# Patient Record
Sex: MALE | Race: WHITE | HISPANIC OR LATINO | Employment: OTHER | URBAN - METROPOLITAN AREA
[De-identification: names, ages, dates, MRNs, and addresses within clinical notes are randomized per-mention and may not be internally consistent; named-entity substitution may affect disease eponyms.]

---

## 2023-09-04 ENCOUNTER — HOSPITAL ENCOUNTER (EMERGENCY)
Facility: HOSPITAL | Age: 38
Discharge: HOME | End: 2023-09-04
Attending: FAMILY MEDICINE

## 2023-09-04 VITALS
DIASTOLIC BLOOD PRESSURE: 89 MMHG | HEART RATE: 95 BPM | SYSTOLIC BLOOD PRESSURE: 150 MMHG | TEMPERATURE: 98 F | RESPIRATION RATE: 20 BRPM | WEIGHT: 175 LBS | BODY MASS INDEX: 29.16 KG/M2 | OXYGEN SATURATION: 100 % | HEIGHT: 65 IN

## 2023-09-04 DIAGNOSIS — L08.9 RIGHT FOOT INFECTION: ICD-10-CM

## 2023-09-04 DIAGNOSIS — R42 DIZZINESS: ICD-10-CM

## 2023-09-04 DIAGNOSIS — E11.00 DIABETES MELLITUS TYPE 2 WITH HYPEROSMOLARITY, UNCONTROLLED: Primary | ICD-10-CM

## 2023-09-04 LAB
ALBUMIN SERPL BCP-MCNC: 4 G/DL (ref 3.5–5.2)
ALP SERPL-CCNC: 191 U/L (ref 38–126)
ALT SERPL W/O P-5'-P-CCNC: 21 U/L (ref 10–44)
ANION GAP SERPL CALC-SCNC: 13 MMOL/L (ref 8–16)
AST SERPL-CCNC: 18 U/L (ref 15–46)
B-OH-BUTYR BLD STRIP-SCNC: 0.1 MMOL/L (ref 0–0.5)
BACTERIA #/AREA URNS AUTO: NORMAL /HPF
BASOPHILS # BLD AUTO: 0.03 K/UL (ref 0–0.2)
BASOPHILS NFR BLD: 0.3 % (ref 0–1.9)
BILIRUB SERPL-MCNC: 0.7 MG/DL (ref 0.1–1)
BILIRUB UR QL STRIP: NEGATIVE
CALCIUM SERPL-MCNC: 9.3 MG/DL (ref 8.7–10.5)
CHLORIDE SERPL-SCNC: 99 MMOL/L (ref 95–110)
CLARITY UR REFRACT.AUTO: CLEAR
CO2 SERPL-SCNC: 21 MMOL/L (ref 23–29)
COLOR UR AUTO: YELLOW
CREAT SERPL-MCNC: 0.95 MG/DL (ref 0.5–1.4)
DIFFERENTIAL METHOD: ABNORMAL
EOSINOPHIL # BLD AUTO: 0.1 K/UL (ref 0–0.5)
EOSINOPHIL NFR BLD: 1.4 % (ref 0–8)
ERYTHROCYTE [DISTWIDTH] IN BLOOD BY AUTOMATED COUNT: 11.3 % (ref 11.5–14.5)
EST. GFR  (NO RACE VARIABLE): >60 ML/MIN/1.73 M^2
GLUCOSE SERPL-MCNC: 506 MG/DL (ref 70–110)
GLUCOSE UR QL STRIP: ABNORMAL
HCT VFR BLD AUTO: 36.2 % (ref 40–54)
HGB BLD-MCNC: 13.3 G/DL (ref 14–18)
HGB UR QL STRIP: ABNORMAL
HYALINE CASTS UR QL AUTO: 0 /LPF
IMM GRANULOCYTES # BLD AUTO: 0.03 K/UL (ref 0–0.04)
IMM GRANULOCYTES NFR BLD AUTO: 0.3 % (ref 0–0.5)
KETONES UR QL STRIP: NEGATIVE
LEUKOCYTE ESTERASE UR QL STRIP: NEGATIVE
LYMPHOCYTES # BLD AUTO: 1.9 K/UL (ref 1–4.8)
LYMPHOCYTES NFR BLD: 21.6 % (ref 18–48)
MCH RBC QN AUTO: 30.4 PG (ref 27–31)
MCHC RBC AUTO-ENTMCNC: 36.7 G/DL (ref 32–36)
MCV RBC AUTO: 83 FL (ref 82–98)
MICROSCOPIC COMMENT: NORMAL
MONOCYTES # BLD AUTO: 0.8 K/UL (ref 0.3–1)
MONOCYTES NFR BLD: 9 % (ref 4–15)
NEUTROPHILS # BLD AUTO: 5.9 K/UL (ref 1.8–7.7)
NEUTROPHILS NFR BLD: 67.4 % (ref 38–73)
NITRITE UR QL STRIP: NEGATIVE
NRBC BLD-RTO: 0 /100 WBC
PH UR STRIP: 6 [PH] (ref 5–8)
PLATELET # BLD AUTO: 275 K/UL (ref 150–450)
PMV BLD AUTO: 10.9 FL (ref 9.2–12.9)
POCT GLUCOSE: 270 MG/DL (ref 70–110)
POCT GLUCOSE: 442 MG/DL (ref 70–110)
POCT GLUCOSE: 448 MG/DL (ref 70–110)
POTASSIUM SERPL-SCNC: 4.5 MMOL/L (ref 3.5–5.1)
PROT SERPL-MCNC: 7.9 G/DL (ref 6–8.4)
PROT UR QL STRIP: ABNORMAL
RBC # BLD AUTO: 4.38 M/UL (ref 4.6–6.2)
RBC #/AREA URNS AUTO: 2 /HPF (ref 0–4)
SODIUM SERPL-SCNC: 133 MMOL/L (ref 136–145)
SP GR UR STRIP: 1.01 (ref 1–1.03)
URN SPEC COLLECT METH UR: ABNORMAL
UROBILINOGEN UR STRIP-ACNC: NEGATIVE EU/DL
UUN UR-MCNC: 20 MG/DL (ref 2–20)
WBC # BLD AUTO: 8.8 K/UL (ref 3.9–12.7)
WBC #/AREA URNS AUTO: 1 /HPF (ref 0–5)
YEAST UR QL AUTO: NORMAL

## 2023-09-04 PROCEDURE — 93005 ELECTROCARDIOGRAM TRACING: CPT | Mod: ER

## 2023-09-04 PROCEDURE — 82962 GLUCOSE BLOOD TEST: CPT | Mod: ER

## 2023-09-04 PROCEDURE — 25000003 PHARM REV CODE 250: Mod: ER | Performed by: FAMILY MEDICINE

## 2023-09-04 PROCEDURE — 96374 THER/PROPH/DIAG INJ IV PUSH: CPT | Mod: ER

## 2023-09-04 PROCEDURE — 63600175 PHARM REV CODE 636 W HCPCS: Mod: ER | Performed by: FAMILY MEDICINE

## 2023-09-04 PROCEDURE — 93010 ELECTROCARDIOGRAM REPORT: CPT | Mod: ,,, | Performed by: INTERNAL MEDICINE

## 2023-09-04 PROCEDURE — 82010 KETONE BODYS QUAN: CPT | Mod: ER | Performed by: FAMILY MEDICINE

## 2023-09-04 PROCEDURE — 93010 EKG 12-LEAD: ICD-10-PCS | Mod: ,,, | Performed by: INTERNAL MEDICINE

## 2023-09-04 PROCEDURE — 81000 URINALYSIS NONAUTO W/SCOPE: CPT | Mod: ER | Performed by: FAMILY MEDICINE

## 2023-09-04 PROCEDURE — 85025 COMPLETE CBC W/AUTO DIFF WBC: CPT | Mod: ER | Performed by: FAMILY MEDICINE

## 2023-09-04 PROCEDURE — 99900035 HC TECH TIME PER 15 MIN (STAT): Mod: ER

## 2023-09-04 PROCEDURE — 80053 COMPREHEN METABOLIC PANEL: CPT | Mod: ER | Performed by: FAMILY MEDICINE

## 2023-09-04 PROCEDURE — 99284 EMERGENCY DEPT VISIT MOD MDM: CPT | Mod: 25,ER

## 2023-09-04 PROCEDURE — 96375 TX/PRO/DX INJ NEW DRUG ADDON: CPT | Mod: ER

## 2023-09-04 PROCEDURE — 96361 HYDRATE IV INFUSION ADD-ON: CPT | Mod: ER

## 2023-09-04 RX ORDER — CEPHALEXIN 500 MG/1
500 CAPSULE ORAL EVERY 6 HOURS
Qty: 40 CAPSULE | Refills: 0 | Status: ON HOLD | OUTPATIENT
Start: 2023-09-04 | End: 2023-09-13

## 2023-09-04 RX ORDER — METFORMIN HYDROCHLORIDE 1000 MG/1
1000 TABLET ORAL 2 TIMES DAILY WITH MEALS
Qty: 180 TABLET | Refills: 3 | Status: ON HOLD | OUTPATIENT
Start: 2023-09-04 | End: 2023-09-13

## 2023-09-04 RX ORDER — GLYBURIDE 2.5 MG/1
2.5 TABLET ORAL
Qty: 90 TABLET | Refills: 3 | Status: ON HOLD | OUTPATIENT
Start: 2023-09-04 | End: 2023-09-13

## 2023-09-04 RX ADMIN — INSULIN HUMAN 10 UNITS: 100 INJECTION, SOLUTION PARENTERAL at 02:09

## 2023-09-04 RX ADMIN — PIPERACILLIN AND TAZOBACTAM 4.5 G: 4; .5 INJECTION, POWDER, LYOPHILIZED, FOR SOLUTION INTRAVENOUS; PARENTERAL at 02:09

## 2023-09-04 RX ADMIN — SODIUM CHLORIDE 1000 ML: 9 INJECTION, SOLUTION INTRAVENOUS at 02:09

## 2023-09-04 NOTE — ED PROVIDER NOTES
Encounter Date: 9/4/2023       History     Chief Complaint   Patient presents with    Dizziness     I am very dizzy. I think my diabetes maybe bothering me. I haven't had my diabetes pill in 8 months. My mother passed away 2 months ago and havent been taking medicines. I have a pcp in [place] and have been here 1 month and just moved here.  Pt also reports there is wound on my right foot 4 days ago      38-year-old male with history of diabetes has been noncompliant with his medications for few months.  He has been complaining of dizziness lately.  Also has injury to right dorsum of foot with erythema.  Right middle toe bruising and tenderness.    The history is provided by the patient.     Review of patient's allergies indicates:  No Known Allergies  Past Medical History:   Diagnosis Date    Diabetes mellitus      History reviewed. No pertinent surgical history.  No family history on file.  Social History     Tobacco Use    Smoking status: Never    Smokeless tobacco: Never   Substance Use Topics    Alcohol use: Not Currently    Drug use: Not Currently     Review of Systems   Constitutional:  Negative for fever.   HENT:  Negative for sore throat.    Respiratory:  Negative for shortness of breath.    Cardiovascular:  Negative for chest pain.   Gastrointestinal:  Negative for nausea.   Genitourinary:  Negative for dysuria.   Musculoskeletal:  Negative for back pain.   Skin:  Positive for wound. Negative for rash.   Neurological:  Positive for dizziness. Negative for weakness.   Hematological:  Does not bruise/bleed easily.   All other systems reviewed and are negative.      Physical Exam     Initial Vitals [09/04/23 1330]   BP Pulse Resp Temp SpO2   (!) 166/104 106 15 98.1 °F (36.7 °C) 98 %      MAP       --         Physical Exam    Nursing note and vitals reviewed.  Constitutional: Vital signs are normal. He appears well-developed and well-nourished. He is active. No distress.   HENT:   Head: Normocephalic.   Nose:  Nose normal.   Mouth/Throat: Oropharynx is clear and moist and mucous membranes are normal.   Eyes: Conjunctivae, EOM and lids are normal.   Neck: Neck supple.   Normal range of motion.  Cardiovascular:  Normal rate, regular rhythm, S1 normal, S2 normal and normal heart sounds.           Pulmonary/Chest: Breath sounds normal. No respiratory distress. He has no wheezes. He has no rhonchi. He has no rales.   Abdominal: Abdomen is soft. Bowel sounds are normal. He exhibits no distension. There is no abdominal tenderness. There is no rebound and no guarding.   Musculoskeletal:      Right upper arm: Normal.      Left upper arm: Normal.      Cervical back: Normal range of motion and neck supple.      Right lower leg: Normal.      Left lower leg: Normal.     Neurological: He is alert and oriented to person, place, and time. He has normal strength. GCS score is 15. GCS eye subscore is 4. GCS verbal subscore is 5. GCS motor subscore is 6.   Skin: Skin is warm. Capillary refill takes less than 2 seconds.   Psychiatric: He has a normal mood and affect. His speech is normal and behavior is normal. Thought content normal. Cognition and memory are normal.         ED Course   Procedures  Labs Reviewed   URINALYSIS, REFLEX TO URINE CULTURE - Abnormal; Notable for the following components:       Result Value    Protein, UA 2+ (*)     Glucose, UA 3+ (*)     Occult Blood UA 1+ (*)     All other components within normal limits    Narrative:     Preferred Collection Type->Urine, Clean Catch  Specimen Source->Urine   CBC W/ AUTO DIFFERENTIAL - Abnormal; Notable for the following components:    RBC 4.38 (*)     Hemoglobin 13.3 (*)     Hematocrit 36.2 (*)     MCHC 36.7 (*)     RDW 11.3 (*)     All other components within normal limits   COMPREHENSIVE METABOLIC PANEL - Abnormal; Notable for the following components:    Sodium 133 (*)     CO2 21 (*)     Glucose 506 (*)     Alkaline Phosphatase 191 (*)     All other components within normal  limits    Narrative:       Glucose critical result(s) called and verbal readback obtained from   Pily Madrid by JCT1 09/04/2023 14:53   POCT GLUCOSE - Abnormal; Notable for the following components:    POCT Glucose 442 (*)     All other components within normal limits   POCT GLUCOSE - Abnormal; Notable for the following components:    POCT Glucose 448 (*)     All other components within normal limits   POCT GLUCOSE - Abnormal; Notable for the following components:    POCT Glucose 270 (*)     All other components within normal limits   BETA - HYDROXYBUTYRATE, SERUM   URINALYSIS MICROSCOPIC    Narrative:     Preferred Collection Type->Urine, Clean Catch  Specimen Source->Urine   POCT GLUCOSE MONITORING CONTINUOUS          Imaging Results    None          Medications   sodium chloride 0.9% bolus 1,000 mL 1,000 mL (0 mLs Intravenous Stopped 9/4/23 1439)   insulin regular injection 10 Units 0.1 mL (10 Units Intravenous Given 9/4/23 1404)   piperacillin-tazobactam (ZOSYN) 4.5 g in dextrose 5 % in water (D5W) 100 mL IVPB (MB+) (4.5 g Intravenous New Bag 9/4/23 1440)     Medical Decision Making  Diabetes mellitus noncompliant to medications for last few months.  Used to take metformin.  Also has injury to right foot with erythema and bruising.  No fever chills or rigors.    Differential diagnosis include= DKA, hyperglycemia, HHNK, medication noncompliance, wound infection,    Accu-Chek 442.  Initiated insulin, IV fluids and Zosyn.  Recheck blood sugar improved to 270.  Refills given on his medication metformin and added glyburide.  Right foot infection treated with Zosyn in ED and prescription for Keflex.  He is advised to follow up PCP/ED immediately with any worsening symptoms.        Amount and/or Complexity of Data Reviewed  Labs: ordered.     Details: Beta hydroxy normal range.  Glucose 506.  White cell count 8.8.    Risk  OTC drugs.  Prescription drug management.                               Clinical Impression:    Final diagnoses:  [R42] Dizziness  [E11.00, E11.65] Diabetes mellitus type 2 with hyperosmolarity, uncontrolled (Primary)  [L08.9] Right foot infection        ED Disposition Condition    Discharge Stable          ED Prescriptions       Medication Sig Dispense Start Date End Date Auth. Provider    metFORMIN (GLUCOPHAGE) 1000 MG tablet Take 1 tablet (1,000 mg total) by mouth 2 (two) times daily with meals. 180 tablet 9/4/2023 9/3/2024 Dylan Soares MD    glyBURIDE (DIABETA) 2.5 MG tablet Take 1 tablet (2.5 mg total) by mouth daily with breakfast. 90 tablet 9/4/2023 9/3/2024 Dylan Soares MD    cephALEXin (KEFLEX) 500 MG capsule Take 1 capsule (500 mg total) by mouth every 6 (six) hours. 40 capsule 9/4/2023 -- Dylan Soares MD          Follow-up Information    None          Dylan Soares MD  09/04/23 6041

## 2023-09-12 ENCOUNTER — HOSPITAL ENCOUNTER (INPATIENT)
Facility: HOSPITAL | Age: 38
LOS: 5 days | Discharge: HOME | DRG: 571 | End: 2023-09-17
Attending: EMERGENCY MEDICINE | Admitting: HOSPITALIST

## 2023-09-12 DIAGNOSIS — N17.9 AKI (ACUTE KIDNEY INJURY): Primary | ICD-10-CM

## 2023-09-12 DIAGNOSIS — E11.621 DIABETIC ULCER OF RIGHT MIDFOOT ASSOCIATED WITH TYPE 2 DIABETES MELLITUS, UNSPECIFIED ULCER STAGE: ICD-10-CM

## 2023-09-12 DIAGNOSIS — R07.9 CHEST PAIN: ICD-10-CM

## 2023-09-12 DIAGNOSIS — L97.419 DIABETIC ULCER OF RIGHT MIDFOOT ASSOCIATED WITH TYPE 2 DIABETES MELLITUS, UNSPECIFIED ULCER STAGE: ICD-10-CM

## 2023-09-12 LAB
ALBUMIN SERPL BCP-MCNC: 3.8 G/DL (ref 3.5–5.2)
ALP SERPL-CCNC: 146 U/L (ref 38–126)
ALT SERPL W/O P-5'-P-CCNC: 24 U/L (ref 10–44)
ANION GAP SERPL CALC-SCNC: 13 MMOL/L (ref 8–16)
AST SERPL-CCNC: 18 U/L (ref 15–46)
BASOPHILS # BLD AUTO: 0.03 K/UL (ref 0–0.2)
BASOPHILS NFR BLD: 0.3 % (ref 0–1.9)
BILIRUB SERPL-MCNC: 0.4 MG/DL (ref 0.1–1)
CALCIUM SERPL-MCNC: 9.1 MG/DL (ref 8.7–10.5)
CHLORIDE SERPL-SCNC: 93 MMOL/L (ref 95–110)
CO2 SERPL-SCNC: 21 MMOL/L (ref 23–29)
CREAT SERPL-MCNC: 1.24 MG/DL (ref 0.5–1.4)
CRP SERPL-MCNC: 1.4 MG/DL (ref 0–1)
DIFFERENTIAL METHOD: ABNORMAL
EOSINOPHIL # BLD AUTO: 0.2 K/UL (ref 0–0.5)
EOSINOPHIL NFR BLD: 1.6 % (ref 0–8)
ERYTHROCYTE [DISTWIDTH] IN BLOOD BY AUTOMATED COUNT: 11.3 % (ref 11.5–14.5)
EST. GFR  (NO RACE VARIABLE): >60 ML/MIN/1.73 M^2
GLUCOSE SERPL-MCNC: 418 MG/DL (ref 70–110)
HCT VFR BLD AUTO: 35.2 % (ref 40–54)
HGB BLD-MCNC: 12.3 G/DL (ref 14–18)
IMM GRANULOCYTES # BLD AUTO: 0.02 K/UL (ref 0–0.04)
IMM GRANULOCYTES NFR BLD AUTO: 0.2 % (ref 0–0.5)
LYMPHOCYTES # BLD AUTO: 3.3 K/UL (ref 1–4.8)
LYMPHOCYTES NFR BLD: 33.9 % (ref 18–48)
MCH RBC QN AUTO: 29.1 PG (ref 27–31)
MCHC RBC AUTO-ENTMCNC: 34.9 G/DL (ref 32–36)
MCV RBC AUTO: 83 FL (ref 82–98)
MONOCYTES # BLD AUTO: 0.7 K/UL (ref 0.3–1)
MONOCYTES NFR BLD: 7.2 % (ref 4–15)
NEUTROPHILS # BLD AUTO: 5.6 K/UL (ref 1.8–7.7)
NEUTROPHILS NFR BLD: 56.8 % (ref 38–73)
NRBC BLD-RTO: 0 /100 WBC
PLATELET # BLD AUTO: 298 K/UL (ref 150–450)
PMV BLD AUTO: 10.4 FL (ref 9.2–12.9)
POCT GLUCOSE: 330 MG/DL (ref 70–110)
POCT GLUCOSE: 405 MG/DL (ref 70–110)
POTASSIUM SERPL-SCNC: 4.3 MMOL/L (ref 3.5–5.1)
PROT SERPL-MCNC: 7.6 G/DL (ref 6–8.4)
RBC # BLD AUTO: 4.23 M/UL (ref 4.6–6.2)
SODIUM SERPL-SCNC: 127 MMOL/L (ref 136–145)
UUN UR-MCNC: 31 MG/DL (ref 2–20)
WBC # BLD AUTO: 9.78 K/UL (ref 3.9–12.7)

## 2023-09-12 PROCEDURE — 85025 COMPLETE CBC W/AUTO DIFF WBC: CPT | Mod: ER | Performed by: NURSE PRACTITIONER

## 2023-09-12 PROCEDURE — 96367 TX/PROPH/DG ADDL SEQ IV INF: CPT | Mod: ER

## 2023-09-12 PROCEDURE — 25000003 PHARM REV CODE 250: Mod: ER | Performed by: EMERGENCY MEDICINE

## 2023-09-12 PROCEDURE — 96365 THER/PROPH/DIAG IV INF INIT: CPT | Mod: ER

## 2023-09-12 PROCEDURE — 63600175 PHARM REV CODE 636 W HCPCS: Mod: ER | Performed by: EMERGENCY MEDICINE

## 2023-09-12 PROCEDURE — 11000001 HC ACUTE MED/SURG PRIVATE ROOM

## 2023-09-12 PROCEDURE — 80053 COMPREHEN METABOLIC PANEL: CPT | Mod: ER | Performed by: NURSE PRACTITIONER

## 2023-09-12 PROCEDURE — 86140 C-REACTIVE PROTEIN: CPT | Mod: ER | Performed by: NURSE PRACTITIONER

## 2023-09-12 PROCEDURE — 63600175 PHARM REV CODE 636 W HCPCS: Mod: ER | Performed by: NURSE PRACTITIONER

## 2023-09-12 PROCEDURE — 25000003 PHARM REV CODE 250: Mod: ER | Performed by: NURSE PRACTITIONER

## 2023-09-12 PROCEDURE — 82962 GLUCOSE BLOOD TEST: CPT | Mod: ER

## 2023-09-12 PROCEDURE — 99285 EMERGENCY DEPT VISIT HI MDM: CPT | Mod: ER,25

## 2023-09-12 RX ORDER — HYDROCODONE BITARTRATE AND ACETAMINOPHEN 5; 325 MG/1; MG/1
1 TABLET ORAL
Status: COMPLETED | OUTPATIENT
Start: 2023-09-12 | End: 2023-09-12

## 2023-09-12 RX ORDER — IBUPROFEN 600 MG/1
600 TABLET ORAL
Status: COMPLETED | OUTPATIENT
Start: 2023-09-12 | End: 2023-09-12

## 2023-09-12 RX ADMIN — IBUPROFEN 600 MG: 600 TABLET, FILM COATED ORAL at 07:09

## 2023-09-12 RX ADMIN — VANCOMYCIN HYDROCHLORIDE 2000 MG: 1 INJECTION, POWDER, LYOPHILIZED, FOR SOLUTION INTRAVENOUS at 08:09

## 2023-09-12 RX ADMIN — HYDROCODONE BITARTRATE AND ACETAMINOPHEN 1 TABLET: 5; 325 TABLET ORAL at 08:09

## 2023-09-12 RX ADMIN — PIPERACILLIN AND TAZOBACTAM 4.5 G: 4; .5 INJECTION, POWDER, LYOPHILIZED, FOR SOLUTION INTRAVENOUS; PARENTERAL at 08:09

## 2023-09-12 RX ADMIN — SODIUM CHLORIDE 1000 ML: 9 INJECTION, SOLUTION INTRAVENOUS at 09:09

## 2023-09-13 PROBLEM — L03.90 CELLULITIS: Status: ACTIVE | Noted: 2023-09-13

## 2023-09-13 PROBLEM — E11.59 TYPE 2 DIABETES MELLITUS WITH CIRCULATORY DISORDER, WITHOUT LONG-TERM CURRENT USE OF INSULIN: Status: ACTIVE | Noted: 2023-09-13

## 2023-09-13 PROBLEM — L03.90 CELLULITIS: Chronic | Status: ACTIVE | Noted: 2023-09-13

## 2023-09-13 PROBLEM — R74.8 ELEVATED ALKALINE PHOSPHATASE LEVEL: Status: ACTIVE | Noted: 2023-09-13

## 2023-09-13 PROBLEM — D64.9 NORMOCYTIC ANEMIA: Status: ACTIVE | Noted: 2023-09-13

## 2023-09-13 PROBLEM — E87.1 HYPONATREMIA: Status: ACTIVE | Noted: 2023-09-13

## 2023-09-13 LAB
ANION GAP SERPL CALC-SCNC: 8 MMOL/L (ref 8–16)
BACTERIA #/AREA URNS HPF: NORMAL /HPF
BASOPHILS # BLD AUTO: 0.03 K/UL (ref 0–0.2)
BASOPHILS NFR BLD: 0.4 % (ref 0–1.9)
BILIRUB UR QL STRIP: NEGATIVE
BUN SERPL-MCNC: 24 MG/DL (ref 6–20)
CALCIUM SERPL-MCNC: 8.5 MG/DL (ref 8.7–10.5)
CHLORIDE SERPL-SCNC: 102 MMOL/L (ref 95–110)
CHOLEST SERPL-MCNC: 152 MG/DL (ref 120–199)
CHOLEST/HDLC SERPL: 5.2 {RATIO} (ref 2–5)
CLARITY UR: CLEAR
CO2 SERPL-SCNC: 24 MMOL/L (ref 23–29)
COLOR UR: COLORLESS
CREAT SERPL-MCNC: 1 MG/DL (ref 0.5–1.4)
DIFFERENTIAL METHOD: ABNORMAL
EOSINOPHIL # BLD AUTO: 0.2 K/UL (ref 0–0.5)
EOSINOPHIL NFR BLD: 2.4 % (ref 0–8)
ERYTHROCYTE [DISTWIDTH] IN BLOOD BY AUTOMATED COUNT: 11.4 % (ref 11.5–14.5)
EST. GFR  (NO RACE VARIABLE): >60 ML/MIN/1.73 M^2
ESTIMATED AVG GLUCOSE: 324 MG/DL (ref 68–131)
GLUCOSE SERPL-MCNC: 326 MG/DL (ref 70–110)
GLUCOSE SERPL-MCNC: 399 MG/DL (ref 70–110)
GLUCOSE UR QL STRIP: ABNORMAL
HBA1C MFR BLD: 12.9 % (ref 4–5.6)
HCT VFR BLD AUTO: 32.8 % (ref 40–54)
HDLC SERPL-MCNC: 29 MG/DL (ref 40–75)
HDLC SERPL: 19.1 % (ref 20–50)
HGB BLD-MCNC: 11.7 G/DL (ref 14–18)
HGB UR QL STRIP: ABNORMAL
HYALINE CASTS #/AREA URNS LPF: 0 /LPF
IMM GRANULOCYTES # BLD AUTO: 0.02 K/UL (ref 0–0.04)
IMM GRANULOCYTES NFR BLD AUTO: 0.3 % (ref 0–0.5)
KETONES UR QL STRIP: NEGATIVE
LDLC SERPL CALC-MCNC: ABNORMAL MG/DL (ref 63–159)
LEUKOCYTE ESTERASE UR QL STRIP: NEGATIVE
LYMPHOCYTES # BLD AUTO: 3 K/UL (ref 1–4.8)
LYMPHOCYTES NFR BLD: 37.3 % (ref 18–48)
MCH RBC QN AUTO: 29.2 PG (ref 27–31)
MCHC RBC AUTO-ENTMCNC: 35.7 G/DL (ref 32–36)
MCV RBC AUTO: 82 FL (ref 82–98)
MICROSCOPIC COMMENT: NORMAL
MONOCYTES # BLD AUTO: 0.7 K/UL (ref 0.3–1)
MONOCYTES NFR BLD: 9.2 % (ref 4–15)
NEUTROPHILS # BLD AUTO: 4 K/UL (ref 1.8–7.7)
NEUTROPHILS NFR BLD: 50.4 % (ref 38–73)
NITRITE UR QL STRIP: NEGATIVE
NONHDLC SERPL-MCNC: 123 MG/DL
NRBC BLD-RTO: 0 /100 WBC
PH UR STRIP: 6 [PH] (ref 5–8)
PLATELET # BLD AUTO: 254 K/UL (ref 150–450)
PMV BLD AUTO: 10.5 FL (ref 9.2–12.9)
POCT GLUCOSE: 247 MG/DL (ref 70–110)
POCT GLUCOSE: 289 MG/DL (ref 70–110)
POCT GLUCOSE: 326 MG/DL (ref 70–110)
POCT GLUCOSE: 345 MG/DL (ref 70–110)
POCT GLUCOSE: 354 MG/DL (ref 70–110)
POTASSIUM SERPL-SCNC: 3.8 MMOL/L (ref 3.5–5.1)
PROT UR QL STRIP: ABNORMAL
RBC # BLD AUTO: 4.01 M/UL (ref 4.6–6.2)
RBC #/AREA URNS HPF: 2 /HPF (ref 0–4)
SODIUM SERPL-SCNC: 134 MMOL/L (ref 136–145)
SP GR UR STRIP: 1.02 (ref 1–1.03)
TRIGL SERPL-MCNC: 425 MG/DL (ref 30–150)
URN SPEC COLLECT METH UR: ABNORMAL
UROBILINOGEN UR STRIP-ACNC: NEGATIVE EU/DL
WBC # BLD AUTO: 7.97 K/UL (ref 3.9–12.7)
WBC #/AREA URNS HPF: 3 /HPF (ref 0–5)
YEAST URNS QL MICRO: NORMAL

## 2023-09-13 PROCEDURE — 25000003 PHARM REV CODE 250: Performed by: HOSPITALIST

## 2023-09-13 PROCEDURE — 25000003 PHARM REV CODE 250: Performed by: STUDENT IN AN ORGANIZED HEALTH CARE EDUCATION/TRAINING PROGRAM

## 2023-09-13 PROCEDURE — 36415 COLL VENOUS BLD VENIPUNCTURE: CPT | Performed by: STUDENT IN AN ORGANIZED HEALTH CARE EDUCATION/TRAINING PROGRAM

## 2023-09-13 PROCEDURE — 99900035 HC TECH TIME PER 15 MIN (STAT)

## 2023-09-13 PROCEDURE — 11000001 HC ACUTE MED/SURG PRIVATE ROOM

## 2023-09-13 PROCEDURE — 80061 LIPID PANEL: CPT | Performed by: STUDENT IN AN ORGANIZED HEALTH CARE EDUCATION/TRAINING PROGRAM

## 2023-09-13 PROCEDURE — 63600175 PHARM REV CODE 636 W HCPCS: Performed by: HOSPITALIST

## 2023-09-13 PROCEDURE — 83036 HEMOGLOBIN GLYCOSYLATED A1C: CPT | Performed by: STUDENT IN AN ORGANIZED HEALTH CARE EDUCATION/TRAINING PROGRAM

## 2023-09-13 PROCEDURE — 81000 URINALYSIS NONAUTO W/SCOPE: CPT | Performed by: HOSPITALIST

## 2023-09-13 PROCEDURE — 63600175 PHARM REV CODE 636 W HCPCS: Performed by: STUDENT IN AN ORGANIZED HEALTH CARE EDUCATION/TRAINING PROGRAM

## 2023-09-13 PROCEDURE — 94761 N-INVAS EAR/PLS OXIMETRY MLT: CPT

## 2023-09-13 PROCEDURE — 80048 BASIC METABOLIC PNL TOTAL CA: CPT | Performed by: STUDENT IN AN ORGANIZED HEALTH CARE EDUCATION/TRAINING PROGRAM

## 2023-09-13 PROCEDURE — 85025 COMPLETE CBC W/AUTO DIFF WBC: CPT | Performed by: STUDENT IN AN ORGANIZED HEALTH CARE EDUCATION/TRAINING PROGRAM

## 2023-09-13 RX ORDER — INSULIN ASPART 100 [IU]/ML
6 INJECTION, SOLUTION INTRAVENOUS; SUBCUTANEOUS
Status: DISCONTINUED | OUTPATIENT
Start: 2023-09-14 | End: 2023-09-15

## 2023-09-13 RX ORDER — TALC
6 POWDER (GRAM) TOPICAL NIGHTLY PRN
Status: DISCONTINUED | OUTPATIENT
Start: 2023-09-13 | End: 2023-09-17 | Stop reason: HOSPADM

## 2023-09-13 RX ORDER — INSULIN ASPART 100 [IU]/ML
4 INJECTION, SOLUTION INTRAVENOUS; SUBCUTANEOUS
Status: DISCONTINUED | OUTPATIENT
Start: 2023-09-13 | End: 2023-09-13

## 2023-09-13 RX ORDER — IBUPROFEN 200 MG
16 TABLET ORAL
Status: DISCONTINUED | OUTPATIENT
Start: 2023-09-13 | End: 2023-09-17 | Stop reason: HOSPADM

## 2023-09-13 RX ORDER — SIMETHICONE 80 MG
1 TABLET,CHEWABLE ORAL 4 TIMES DAILY PRN
Status: DISCONTINUED | OUTPATIENT
Start: 2023-09-13 | End: 2023-09-17 | Stop reason: HOSPADM

## 2023-09-13 RX ORDER — GLUCAGON 1 MG
1 KIT INJECTION
Status: DISCONTINUED | OUTPATIENT
Start: 2023-09-13 | End: 2023-09-13 | Stop reason: SDUPTHER

## 2023-09-13 RX ORDER — NALOXONE HCL 0.4 MG/ML
0.02 VIAL (ML) INJECTION
Status: DISCONTINUED | OUTPATIENT
Start: 2023-09-13 | End: 2023-09-17 | Stop reason: HOSPADM

## 2023-09-13 RX ORDER — SODIUM CHLORIDE 0.9 % (FLUSH) 0.9 %
10 SYRINGE (ML) INJECTION
Status: DISCONTINUED | OUTPATIENT
Start: 2023-09-13 | End: 2023-09-17 | Stop reason: HOSPADM

## 2023-09-13 RX ORDER — IBUPROFEN 200 MG
24 TABLET ORAL
Status: DISCONTINUED | OUTPATIENT
Start: 2023-09-13 | End: 2023-09-17 | Stop reason: HOSPADM

## 2023-09-13 RX ORDER — IBUPROFEN 200 MG
24 TABLET ORAL
Status: DISCONTINUED | OUTPATIENT
Start: 2023-09-13 | End: 2023-09-13 | Stop reason: SDUPTHER

## 2023-09-13 RX ORDER — SODIUM CHLORIDE 0.9 % (FLUSH) 0.9 %
10 SYRINGE (ML) INJECTION EVERY 12 HOURS PRN
Status: DISCONTINUED | OUTPATIENT
Start: 2023-09-13 | End: 2023-09-17 | Stop reason: HOSPADM

## 2023-09-13 RX ORDER — LISINOPRIL 10 MG/1
10 TABLET ORAL DAILY
Status: DISCONTINUED | OUTPATIENT
Start: 2023-09-14 | End: 2023-09-17 | Stop reason: HOSPADM

## 2023-09-13 RX ORDER — ATORVASTATIN CALCIUM 40 MG/1
40 TABLET, FILM COATED ORAL DAILY
Status: DISCONTINUED | OUTPATIENT
Start: 2023-09-14 | End: 2023-09-17 | Stop reason: HOSPADM

## 2023-09-13 RX ORDER — IBUPROFEN 200 MG
16 TABLET ORAL
Status: DISCONTINUED | OUTPATIENT
Start: 2023-09-13 | End: 2023-09-13 | Stop reason: SDUPTHER

## 2023-09-13 RX ORDER — INSULIN ASPART 100 [IU]/ML
0-5 INJECTION, SOLUTION INTRAVENOUS; SUBCUTANEOUS
Status: DISCONTINUED | OUTPATIENT
Start: 2023-09-13 | End: 2023-09-17 | Stop reason: HOSPADM

## 2023-09-13 RX ORDER — ACETAMINOPHEN 325 MG/1
650 TABLET ORAL EVERY 4 HOURS PRN
Status: DISCONTINUED | OUTPATIENT
Start: 2023-09-13 | End: 2023-09-17 | Stop reason: HOSPADM

## 2023-09-13 RX ORDER — ONDANSETRON 2 MG/ML
4 INJECTION INTRAMUSCULAR; INTRAVENOUS EVERY 8 HOURS PRN
Status: DISCONTINUED | OUTPATIENT
Start: 2023-09-13 | End: 2023-09-17 | Stop reason: HOSPADM

## 2023-09-13 RX ORDER — HYDROCODONE BITARTRATE AND ACETAMINOPHEN 5; 325 MG/1; MG/1
1 TABLET ORAL EVERY 6 HOURS PRN
Status: DISCONTINUED | OUTPATIENT
Start: 2023-09-13 | End: 2023-09-17 | Stop reason: HOSPADM

## 2023-09-13 RX ORDER — GLUCAGON 1 MG
1 KIT INJECTION
Status: DISCONTINUED | OUTPATIENT
Start: 2023-09-13 | End: 2023-09-17 | Stop reason: HOSPADM

## 2023-09-13 RX ADMIN — ACETAMINOPHEN 650 MG: 325 TABLET ORAL at 08:09

## 2023-09-13 RX ADMIN — INSULIN ASPART 3 UNITS: 100 INJECTION, SOLUTION INTRAVENOUS; SUBCUTANEOUS at 12:09

## 2023-09-13 RX ADMIN — PIPERACILLIN AND TAZOBACTAM 4.5 G: 4; .5 INJECTION, POWDER, LYOPHILIZED, FOR SOLUTION INTRAVENOUS; PARENTERAL at 04:09

## 2023-09-13 RX ADMIN — INSULIN ASPART 2 UNITS: 100 INJECTION, SOLUTION INTRAVENOUS; SUBCUTANEOUS at 05:09

## 2023-09-13 RX ADMIN — INSULIN ASPART 5 UNITS: 100 INJECTION, SOLUTION INTRAVENOUS; SUBCUTANEOUS at 04:09

## 2023-09-13 RX ADMIN — INSULIN ASPART 2 UNITS: 100 INJECTION, SOLUTION INTRAVENOUS; SUBCUTANEOUS at 09:09

## 2023-09-13 RX ADMIN — INSULIN ASPART 4 UNITS: 100 INJECTION, SOLUTION INTRAVENOUS; SUBCUTANEOUS at 05:09

## 2023-09-13 RX ADMIN — INSULIN DETEMIR 10 UNITS: 100 INJECTION, SOLUTION SUBCUTANEOUS at 04:09

## 2023-09-13 RX ADMIN — VANCOMYCIN HYDROCHLORIDE 1000 MG: 1 INJECTION, POWDER, LYOPHILIZED, FOR SOLUTION INTRAVENOUS at 10:09

## 2023-09-13 RX ADMIN — PIPERACILLIN AND TAZOBACTAM 4.5 G: 4; .5 INJECTION, POWDER, LYOPHILIZED, FOR SOLUTION INTRAVENOUS; PARENTERAL at 12:09

## 2023-09-13 RX ADMIN — INSULIN ASPART 4 UNITS: 100 INJECTION, SOLUTION INTRAVENOUS; SUBCUTANEOUS at 08:09

## 2023-09-13 RX ADMIN — VANCOMYCIN HYDROCHLORIDE 1000 MG: 1 INJECTION, POWDER, LYOPHILIZED, FOR SOLUTION INTRAVENOUS at 08:09

## 2023-09-13 RX ADMIN — PIPERACILLIN AND TAZOBACTAM 4.5 G: 4; .5 INJECTION, POWDER, LYOPHILIZED, FOR SOLUTION INTRAVENOUS; PARENTERAL at 08:09

## 2023-09-13 NOTE — H&P
[facility] Medicine  History & Physical    Patient Name: Juanpablo Gomez  MRN: [id number]  Patient Class: IP- Inpatient  Admission Date: 9/12/2023  Attending Physician: Kirk Bueno,*   Primary Care Provider: Radha, Primary Doctor         Patient information was obtained from patient and ER records.     Subjective:     Principal Problem:Cellulitis    Chief Complaint:   Chief Complaint   Patient presents with    Right foot pain      Patient recently seen in the ER for the same symptoms. Patient reports pain is worse         HPI: Juanpablo Gonzalez is a 38 yr old male with PMH of diabetes, who presents with foot wound.    Pt states that over the past week, his foot has had worsening erythema, swelling, and pain. He was prescribed oral antibiotics, although the pain and swelling persisted, which prompted him to come to the ED for further evaluation.    In the ED, triage vitals were significant for elevated blood pressures, tachycardia. CBC was significant for normoctyic anemia. CMP was significant for hyponatremia and elevated sugars.    XR of ankle and foot was negative for gangrene or fracture.     Medicine was consult for cellulitis, that failed outpatient txt.       Past Medical History:   Diagnosis Date    Diabetes mellitus        No past surgical history on file.    Review of patient's allergies indicates:  No Known Allergies    No current facility-administered medications on file prior to encounter.     Current Outpatient Medications on File Prior to Encounter   Medication Sig    cephALEXin (KEFLEX) 500 MG capsule Take 1 capsule (500 mg total) by mouth every 6 (six) hours.    glyBURIDE (DIABETA) 2.5 MG tablet Take 1 tablet (2.5 mg total) by mouth daily with breakfast.    metFORMIN (GLUCOPHAGE) 1000 MG tablet Take 1 tablet (1,000 mg total) by mouth 2 (two) times daily with meals.     Family History    None       Tobacco Use    Smoking status: Never    Smokeless tobacco: Never   Substance  and Sexual Activity    Alcohol use: Not Currently    Drug use: Not Currently    Sexual activity: Not on file     Review of Systems   Constitutional:  Negative for activity change and appetite change.   HENT:  Negative for mouth sores and nosebleeds.    Eyes:  Negative for pain.   Respiratory:  Negative for wheezing and stridor.    Cardiovascular:  Negative for chest pain and leg swelling.   Gastrointestinal:  Negative for anal bleeding and blood in stool.   Endocrine: Negative for polydipsia and polyphagia.   Genitourinary:  Negative for penile discharge and penile pain.   Musculoskeletal:  Positive for arthralgias.   Skin:  Positive for color change, pallor and rash.   Allergic/Immunologic: Negative for food allergies and immunocompromised state.   Neurological:  Negative for light-headedness.   Hematological:  Negative for adenopathy. Does not bruise/bleed easily.   Psychiatric/Behavioral:  Negative for confusion and decreased concentration.      Objective:     Vital Signs (Most Recent):  Temp: 98.4 °F (36.9 °C) (09/12/23 1905)  Pulse: 97 (09/13/23 0001)  Resp: 19 (09/13/23 0001)  BP: (!) 146/97 (09/13/23 0001)  SpO2: 100 % (09/13/23 0001) Vital Signs (24h Range):  Temp:  [98.4 °F (36.9 °C)] 98.4 °F (36.9 °C)  Pulse:  [] 97  Resp:  [18-19] 19  SpO2:  [99 %-100 %] 100 %  BP: (140-152)/(84-97) 146/97     Weight: 79.4 kg (175 lb)  Body mass index is 29.12 kg/m².     Physical Exam  Vitals reviewed.   Constitutional:       General: He is not in acute distress.     Appearance: Normal appearance. He is not ill-appearing.   HENT:      Head: Normocephalic.      Right Ear: There is no impacted cerumen.      Left Ear: There is no impacted cerumen.      Nose: No congestion or rhinorrhea.      Mouth/Throat:      Pharynx: No oropharyngeal exudate or posterior oropharyngeal erythema.   Eyes:      General:         Right eye: No discharge.         Left eye: No discharge.   Cardiovascular:      Rate and Rhythm: Tachycardia  "present.      Heart sounds: No murmur heard.     No gallop.   Pulmonary:      Breath sounds: No stridor. No rhonchi.   Abdominal:      Palpations: There is no mass.      Hernia: No hernia is present.   Musculoskeletal:         General: No deformity.      Cervical back: No rigidity.      Right lower leg: No edema.   Lymphadenopathy:      Cervical: No cervical adenopathy.   Skin:     General: Skin is warm.      Coloration: Skin is not jaundiced.      Findings: Erythema and lesion present. No bruising.   Neurological:      Mental Status: He is alert.      Cranial Nerves: No cranial nerve deficit.      Motor: No weakness.   Psychiatric:         Mood and Affect: Mood normal.                Significant Labs: All pertinent labs within the past 24 hours have been reviewed.  ABGs: No results for input(s): "PH", "PCO2", "HCO3", "POCSATURATED", "BE", "TOTALHB", "COHB", "METHB", "O2HB", "POCFIO2", "PO2" in the last 48 hours.  Blood Culture: No results for input(s): "LABBLOO" in the last 48 hours.  BMP:   Recent Labs   Lab 09/13/23 0448   *   *   K 3.8      CO2 24   BUN 24*   CREATININE 1.0   CALCIUM 8.5*     CBC:   Recent Labs   Lab 09/12/23 2013 09/13/23 0448   WBC 9.78 7.97   HGB 12.3* 11.7*   HCT 35.2* 32.8*    254     CMP:   Recent Labs   Lab 09/12/23 2013 09/13/23 0448   * 134*   K 4.3 3.8   CL 93* 102   CO2 21* 24   * 399*   BUN 31* 24*   CREATININE 1.24 1.0   CALCIUM 9.1 8.5*   PROT 7.6  --    ALBUMIN 3.8  --    BILITOT 0.4  --    ALKPHOS 146*  --    AST 18  --    ALT 24  --    ANIONGAP 13 8     Lactic Acid: No results for input(s): "LACTATE" in the last 48 hours.  Lipase: No results for input(s): "LIPASE" in the last 48 hours.  Lipid Panel: No results for input(s): "CHOL", "HDL", "LDLCALC", "TRIG", "CHOLHDL" in the last 48 hours.  Magnesium: No results for input(s): "MG" in the last 48 hours.  Troponin: No results for input(s): "TROPONINI", "TROPONINIHS" in the last 48 " "hours.  TSH: No results for input(s): "TSH" in the last 4320 hours.  Urine Culture: No results for input(s): "LABURIN" in the last 48 hours.    Significant Imaging: I have reviewed all pertinent imaging results/findings within the past 24 hours.  I have reviewed and interpreted all pertinent imaging results/findings within the past 24 hours.    Assessment/Plan:     * Cellulitis  Persistent skin infection, with edema, erythema, despite keflex use  Started on Van/zosyn    Plan:  C/w Van/Zosyn  Consult ID  Consult podiatry       Elevated alkaline phosphatase level  C/w monitoring      Hyponatremia  Patient has hyponatremia which is controlled,We will aim to correct the sodium by 4-6mEq in 24 hours. We will monitor sodium Daily. The hyponatremia is due to Dehydration/hypovolemia. We will obtain the following studies: none. We will treat the hyponatremia with IV fluids as follows: s/p fluids in ED. Encourage oral intake. The patient's sodium results have been reviewed and are listed below.  Recent Labs   Lab 09/13/23  0448   *       Normocytic anemia  Transfuse for less then Hg of 7    Type 2 diabetes mellitus with circulatory disorder, without long-term current use of insulin  Start insulin regimin   Hold oral agents        VTE Risk Mitigation (From admission, onward)         Ordered     IP VTE LOW RISK PATIENT  Once         09/13/23 0055     Place sequential compression device  Until discontinued         09/13/23 0055                           Irene Rojas MD  Department of Hospital Medicine  [facility]  "

## 2023-09-13 NOTE — CONSULTS
General Infectious Diseases: Consult Note    Consult Requested By: Kirk Bueno,*    Reason for Consult: diabetic foot wound      IMPRESSION:  39 yo M with pmh of diabetes presenting with a R foot wound that resulted after an injury from his shoes. He presented initially on 9/4 for similar issues and was treated in the ER with zosyn and was discharged on a course of Keflex. He is returning after wound on the dorsum of his foot started draining. Currently on vanc and zosyn.    - continue vanc and zosyn  - will need podiatry evaluation for possible surgical debridement  - will tailor antibiotics to culture data if surgery occurs    Thanks for the consult. ID will follow the patient with you.    Rand Bradshaw MD  [employer] IM/Peds PGY-1  Infectious Disease    SUBJECTIVE:     History of Present Illness:  Patient is a 38 y.o. male with PMH of diabetes presenting with R foot wound. Patient recently moved from [place]. Lives with wife, kids, and dog at home. Has history of diabetes that was controlled with metformin and diet. PCP in [place] recently took him off of metformin. Mother recently passed away and patient has not been doing well since.    9/4: seen in ER for R foot wound and dizziness. Foot at that time was erythematous and bruised. No drainage. Was treated with IV fluids, insulin, and zosyn in the ER and then discharged with Keflex, metformin, and glyburide.    9/12: returned to ER with worsening R foot wound. Pain increased and erythema persisted. He thinks an injury happened from the steel tip of his new work boots. He works in construction. He endorses compliance with antibiotics. Says that a blister on top of wound popped and wound has been draining since. A bruise is present in the 2nd toe as well.    Past Medical History:  Past Medical History:   Diagnosis Date    Diabetes mellitus        Past Surgical History:  No past surgical history on file.    Family History:  No family history on file.    Social  History:  Social History     Tobacco Use    Smoking status: Never    Smokeless tobacco: Never   Substance Use Topics    Alcohol use: Not Currently    Drug use: Not Currently       Allergies:  Review of patient's allergies indicates:  No Known Allergies     Pertinent Medications:  Antibiotics (From admission, onward)      Start     Stop Route Frequency Ordered    09/13/23 0900  vancomycin (VANCOCIN) 1,000 mg in dextrose 5 % (D5W) 250 mL IVPB (Vial-Mate)         -- IV Every 12 hours (non-standard times) 09/13/23 0114    09/13/23 0400  piperacillin-tazobactam (ZOSYN) 4.5 g in dextrose 5 % in water (D5W) 100 mL IVPB (MB+)         -- IV Every 8 hours (non-standard times) 09/13/23 0104    09/13/23 0156  vancomycin - pharmacy to dose  (vancomycin IVPB (PEDS and ADULTS))        See Lindseyce for full Linked Orders Report.    -- IV pharmacy to manage frequency 09/13/23 0100              Review of Symptoms:  Constitutional: Endorses fevers. Denies weight loss, chills, or weakness.  Eyes: Denies changes in vision.  ENT: Denies dysphagia, nasal discharge, ear pain or discharge.  Cardiovascular: Denies chest pain, palpitations, orthopnea, or claudication.  Respiratory: Denies shortness of breath, cough, hemoptysis, or wheezing.  GI: Denies nausea/vomiting, hematochezia, melena, abd pain, or changes in appetite.  : Denies dysuria, incontinence, or hematuria.  Musculoskeletal: Denies joint pain or myalgias.  Skin/breast: Endorses wound on dorsum of R foot with erythema, bruising, draining.  Neurologic: Denies headache, dizziness, vertigo, or paresthesias.  Psychiatric: Denies changes in mood or hallucinations.  Endocrine: Denies polyuria, polydipsia, heat/cold intolerance.  Hematologic/Lymph: Denies lymphadenopathy, easy bruising or easy bleeding.  Allergic/Immunologic: Denies rash, rhinitis.     OBJECTIVE:     Vital Signs (Most Recent)  Temp: 98.6 °F (37 °C) (09/13/23 0745)  Pulse: 100 (09/13/23 1126)  Resp: 20 (09/13/23  1126)  BP: (!) 167/106 (09/13/23 1126)  SpO2: 99 % (09/13/23 1126)    Temperature Range Min/Max (Last 24H):  Temp:  [97.5 °F (36.4 °C)-98.6 °F (37 °C)]     Physical Exam:  Gen: Nontoxic, comfortable, no acute distress.    HEENT: PERRL. No scleral icterus. EOMI intact. No sinus tenderness. OP clear.  Pulmonary: Non labored. Clear to auscultation A/P/L. No wheezing, crackles, or rhonchi.   Cardiac: Normal S1 & S2 w/o rubs/murmurs/gallops.   Abdomen: Normal bowel sounds. Soft, nontender, nondistended. No rebound or guarding. No hepatosplenomegaly.  Extremities: TTP to dorsum of R foot. With swelling and erythema. Bruising noted on 2nd toe. Yellow drainage from wound.  Lymphatics: no preauricular, cervical, supraclavicular, or axillary LAD.  Musculoskeletal: no joint deformities or effusions.  Neurological:  Alert and oriented.  Skin: See MSK      Laboratory:  CBC:   Lab Results   Component Value Date    WBC 7.97 09/13/2023    HGB 11.7 (L) 09/13/2023    HCT 32.8 (L) 09/13/2023    MCV 82 09/13/2023     09/13/2023       BMP:   Recent Labs   Lab 09/13/23  0448   *   *   K 3.8      CO2 24   BUN 24*   CREATININE 1.0   CALCIUM 8.5*       LFT:   Lab Results   Component Value Date    ALT 24 09/12/2023    AST 18 09/12/2023    ALKPHOS 146 (H) 09/12/2023    BILITOT 0.4 09/12/2023       Microbiology:  None      Diagnostic Results:  Xray showed no acute fracture or soft tissue abnormality    ASSESSMENT/PLAN:     There are no hospital problems to display for this patient.      Plan: Please see top of page

## 2023-09-13 NOTE — PROGRESS NOTES
"Pharmacokinetic Initial Assessment: IV Vancomycin    Assessment/Plan:    Initiate intravenous vancomycin with loading dose of 2000 mg once followed by a maintenance dose of vancomycin 1000mg IV every 12 hours  Desired empiric serum trough concentration is 10 to 15 mcg/mL  Draw vancomycin trough level 60 min prior to fourth dose on 9/14 at approximately 0800  Pharmacy will continue to follow and monitor vancomycin.      Please contact pharmacy at extension [phone] with any questions regarding this assessment.     Thank you for the consult,   Yudelka Gonzalez       Patient brief summary:  Juanpablo Gomez is a 38 y.o. male initiated on antimicrobial therapy with IV Vancomycin for treatment of suspected skin & soft tissue infection    Drug Allergies:   Review of patient's allergies indicates:  No Known Allergies    Actual Body Weight:   80.9 kg    Renal Function:   Estimated Creatinine Clearance: 79.9 mL/min (based on SCr of 1.24 mg/dL).,     Dialysis Method (if applicable):  N/A    CBC (last 72 hours):  Recent Labs   Lab Result Units 09/12/23 2013   WBC K/uL 9.78   Hemoglobin g/dL 12.3*   Hematocrit % 35.2*   Platelets K/uL 298   Gran % % 56.8   Lymph % % 33.9   Mono % % 7.2   Eosinophil % % 1.6   Basophil % % 0.3   Differential Method  Automated       Metabolic Panel (last 72 hours):  Recent Labs   Lab Result Units 09/12/23 2013   Sodium mmol/L 127*   Potassium mmol/L 4.3   Chloride mmol/L 93*   CO2 mmol/L 21*   Glucose mg/dL 418*   BUN mg/dL 31*   Creatinine mg/dL 1.24   Albumin g/dL 3.8   Total Bilirubin mg/dL 0.4   Alkaline Phosphatase U/L 146*   AST U/L 18   ALT U/L 24       Drug levels (last 3 results):  No results for input(s): "VANCOMYCINRA", "VANCORANDOM", "VANCOMYCINPE", "VANCOPEAK", "VANCOMYCINTR", "VANCOTROUGH" in the last 72 hours.    Microbiologic Results:  Microbiology Results (last 7 days)       ** No results found for the last 168 hours. **            "

## 2023-09-13 NOTE — PROGRESS NOTES
Pharmacist Renal Dose Adjustment Note    Juanpablo Gomez is a 38 y.o. male being treated with the medication zosyn    Patient Data:    Vital Signs (Most Recent):  Temp: 97.5 °F (36.4 °C) (09/13/23 0053)  Pulse: 97 (09/13/23 0053)  Resp: 20 (09/13/23 0053)  BP: (!) 169/103 (09/13/23 0053)  SpO2: 99 % (09/13/23 0053) Vital Signs (72h Range):  Temp:  [97.5 °F (36.4 °C)-98.4 °F (36.9 °C)]   Pulse:  []   Resp:  [18-20]   BP: (140-169)/()   SpO2:  [99 %-100 %]      Recent Labs   Lab 09/12/23 2013   CREATININE 1.24     Serum creatinine: 1.24 mg/dL 09/12/23 2013  Estimated creatinine clearance: 79.9 mL/min    Medication:zosyn dose: 4.5G frequency q12h will be changed to medication:zosyn dose:4.5G frequency:q8h    Pharmacist's Name: Yudelka Gonzalez  Pharmacist's Extension: [phone]

## 2023-09-13 NOTE — SUBJECTIVE & OBJECTIVE
Past Medical History:   Diagnosis Date    Diabetes mellitus        No past surgical history on file.    Review of patient's allergies indicates:  No Known Allergies    No current facility-administered medications on file prior to encounter.     Current Outpatient Medications on File Prior to Encounter   Medication Sig    cephALEXin (KEFLEX) 500 MG capsule Take 1 capsule (500 mg total) by mouth every 6 (six) hours.    glyBURIDE (DIABETA) 2.5 MG tablet Take 1 tablet (2.5 mg total) by mouth daily with breakfast.    metFORMIN (GLUCOPHAGE) 1000 MG tablet Take 1 tablet (1,000 mg total) by mouth 2 (two) times daily with meals.     Family History    None       Tobacco Use    Smoking status: Never    Smokeless tobacco: Never   Substance and Sexual Activity    Alcohol use: Not Currently    Drug use: Not Currently    Sexual activity: Not on file     Review of Systems   Constitutional:  Negative for activity change and appetite change.   HENT:  Negative for mouth sores and nosebleeds.    Eyes:  Negative for pain.   Respiratory:  Negative for wheezing and stridor.    Cardiovascular:  Negative for chest pain and leg swelling.   Gastrointestinal:  Negative for anal bleeding and blood in stool.   Endocrine: Negative for polydipsia and polyphagia.   Genitourinary:  Negative for penile discharge and penile pain.   Musculoskeletal:  Positive for arthralgias.   Skin:  Positive for color change, pallor and rash.   Allergic/Immunologic: Negative for food allergies and immunocompromised state.   Neurological:  Negative for light-headedness.   Hematological:  Negative for adenopathy. Does not bruise/bleed easily.   Psychiatric/Behavioral:  Negative for confusion and decreased concentration.      Objective:     Vital Signs (Most Recent):  Temp: 98.4 °F (36.9 °C) (09/12/23 1905)  Pulse: 97 (09/13/23 0001)  Resp: 19 (09/13/23 0001)  BP: (!) 146/97 (09/13/23 0001)  SpO2: 100 % (09/13/23 0001) Vital Signs (24h Range):  Temp:  [98.4 °F (36.9  "°C)] 98.4 °F (36.9 °C)  Pulse:  [] 97  Resp:  [18-19] 19  SpO2:  [99 %-100 %] 100 %  BP: (140-152)/(84-97) 146/97     Weight: 79.4 kg (175 lb)  Body mass index is 29.12 kg/m².     Physical Exam  Vitals reviewed.   Constitutional:       General: He is not in acute distress.     Appearance: Normal appearance. He is not ill-appearing.   HENT:      Head: Normocephalic.      Right Ear: There is no impacted cerumen.      Left Ear: There is no impacted cerumen.      Nose: No congestion or rhinorrhea.      Mouth/Throat:      Pharynx: No oropharyngeal exudate or posterior oropharyngeal erythema.   Eyes:      General:         Right eye: No discharge.         Left eye: No discharge.   Cardiovascular:      Rate and Rhythm: Tachycardia present.      Heart sounds: No murmur heard.     No gallop.   Pulmonary:      Breath sounds: No stridor. No rhonchi.   Abdominal:      Palpations: There is no mass.      Hernia: No hernia is present.   Musculoskeletal:         General: No deformity.      Cervical back: No rigidity.      Right lower leg: No edema.   Lymphadenopathy:      Cervical: No cervical adenopathy.   Skin:     General: Skin is warm.      Coloration: Skin is not jaundiced.      Findings: Erythema and lesion present. No bruising.   Neurological:      Mental Status: He is alert.      Cranial Nerves: No cranial nerve deficit.      Motor: No weakness.   Psychiatric:         Mood and Affect: Mood normal.                Significant Labs: All pertinent labs within the past 24 hours have been reviewed.  ABGs: No results for input(s): "PH", "PCO2", "HCO3", "POCSATURATED", "BE", "TOTALHB", "COHB", "METHB", "O2HB", "POCFIO2", "PO2" in the last 48 hours.  Blood Culture: No results for input(s): "LABBLOO" in the last 48 hours.  BMP:   Recent Labs   Lab 09/13/23 0448   *   *   K 3.8      CO2 24   BUN 24*   CREATININE 1.0   CALCIUM 8.5*     CBC:   Recent Labs   Lab 09/12/23 2013 09/13/23 0448   WBC 9.78 7.97   HGB " "12.3* 11.7*   HCT 35.2* 32.8*    254     CMP:   Recent Labs   Lab 09/12/23  2013 09/13/23  0448   * 134*   K 4.3 3.8   CL 93* 102   CO2 21* 24   * 399*   BUN 31* 24*   CREATININE 1.24 1.0   CALCIUM 9.1 8.5*   PROT 7.6  --    ALBUMIN 3.8  --    BILITOT 0.4  --    ALKPHOS 146*  --    AST 18  --    ALT 24  --    ANIONGAP 13 8     Lactic Acid: No results for input(s): "LACTATE" in the last 48 hours.  Lipase: No results for input(s): "LIPASE" in the last 48 hours.  Lipid Panel: No results for input(s): "CHOL", "HDL", "LDLCALC", "TRIG", "CHOLHDL" in the last 48 hours.  Magnesium: No results for input(s): "MG" in the last 48 hours.  Troponin: No results for input(s): "TROPONINI", "TROPONINIHS" in the last 48 hours.  TSH: No results for input(s): "TSH" in the last 4320 hours.  Urine Culture: No results for input(s): "LABURIN" in the last 48 hours.    Significant Imaging: I have reviewed all pertinent imaging results/findings within the past 24 hours.  I have reviewed and interpreted all pertinent imaging results/findings within the past 24 hours.  "

## 2023-09-13 NOTE — PLAN OF CARE
09/13/23 0144   Admission   Initial VN Admission Questions Complete   Communication Issues? None   Shift   Pain Management Interventions quiet environment facilitated;relaxation techniques promoted   Virtual Nurse - Patient Verbalized Approval Of VN Rounding;Camera Use   Type of Frequent Check   Type Telemetry Monitoring;Patient Rounds   Safety/Activity   Patient Rounds bed in low position;bed wheels locked;call light in patient/parent reach;clutter free environment maintained;ID band on;visualized patient;placement of personal items at bedside   Safety Promotion/Fall Prevention assistive device/personal item within reach;bed alarm set;medications reviewed;instructed to call staff for mobility;room near unit station;nonskid shoes/socks when out of bed;side rails raised x 2   Safety Precautions emergency equipment at bedside   Safety Bands on Patient Fall Risk Band   Activity Management Ambulated in room - L4   Activity Assistance Provided assistance, stand-by   Elimination Assistance urinal within reach   Enhanced Safety Measures bed alarm set;room near unit station   Positioning   Body Position position maintained   Head of Bed (HOB) Positioning HOB elevated   Positioning/Transfer Devices pillows;in use      VN cued into room to complete admit assessment. VIP model introduced; VN working alongside bedside treatment team.  Plan of care reviewed with patient. Patient informed of fall risk, fall precautions, call light within reach, side rails x2 elevated. Patient notified to ask staff for assistance. Patient verbalized complete understanding. Time allowed for questions. Will continue to monitor and intervene as needed. NO orders for telemonitoring at this time .

## 2023-09-13 NOTE — TREATMENT PLAN
Mr. Carlos Gomez presents with cellulitis of right foot that failed outpatient keflex. Initiated on BSABx. Only mildly elevated CRP, low suspicion of osteomyelitis. Will continue IV abx for now, likely de-escalate quickly. Will check A1c; suspect patient has been poorly controlled given elevated glucose levels. May need insulin. Hyponatremia is 2/2 hyperglycemia and corrects to normal range. Started on ACEi given HTN. Check lipids and UA, may benefit from microalbuminemia eval as well if no gross proteinuria. Needs to re-establish with PCP.    Kirk Bueno MD  [facility]

## 2023-09-13 NOTE — ASSESSMENT & PLAN NOTE
Persistent skin infection, with edema, erythema, despite keflex use  Started on Van/zosyn    Plan:  C/w Van/Zosyn  Consult ID  Consult podiatry

## 2023-09-13 NOTE — ED NOTES
LOC:The patient is awake, alert and cooperative with a calm affect, patient is aware of environment and behaving in an age appropriate manor, patient recognizes caregiver and is speaking appropriately for age.    APPEARANCE: Resting comfortably, in no acute distress, the patient has clean hair, skin and nails, patient's clothing is properly fastened.    RESPIRATORY: Airway is open and patent, respirations are spontaneous, normal respiratory effort and rate noted.     MUSCULOSKELETAL: Patient moving all extremities well, no obvious deformities noted.    SKIN: pt presents to ER with c/o wound to left foot. Was seen here last week and placed on antibiotics. Wound is to lili of foot about the size of a quarter. Yellow drainage noted. Redness around wound. Wound to 2nd toe as well. Toe nail is purple. No drainage. Denies any fever.     ABDOMEN: Soft and non tender in all four quadrants.

## 2023-09-13 NOTE — PLAN OF CARE
ROMERO met with pt at bedside to discuss dc planning. Pt declined use of . Pt is not insured but is agreeable to be scanned for medicaid. All information confirmed on chart. Pt resides in home with his wife. Pt is independent in ADLs. Pt has no dme/hh services at home. Pt reports upon dc his wife will provide transport home. SW will continue to follow pt throughout his transitions of care and assist with any dc needs.     ROMERO sent medicaid request/review to [facility].      09/13/23 8372   Discharge Assessment   Assessment Type Discharge Planning Assessment   Confirmed/corrected address, phone number and insurance Yes   Confirmed Demographics Correct on Facesheet   Source of Information patient   Communicated ZORA with patient/caregiver Yes   Reason For Admission Cellulitis (Chronic)   People in Home spouse   Do you expect to return to your current living situation? Yes   Do you have help at home or someone to help you manage your care at home? Yes   Who are your caregiver(s) and their phone number(s)? Pati Gonzalez (Spouse)   [phone]   Prior to hospitilization cognitive status: Alert/Oriented   Current cognitive status: Alert/Oriented   Home Layout Able to live on 1st floor   Equipment Currently Used at Home none   Do you take prescription medications? Yes   Do you have prescription coverage? No   Do you have any problems affording any of your prescribed medications? TBD   Is the patient taking medications as prescribed? yes   Who is going to help you get home at discharge? Pati Gonzalez (Spouse)   [phone]   How do you get to doctors appointments? family or friend will provide   Are you on dialysis? No   Do you take coumadin? No   DME Needed Upon Discharge  none   Discharge Plan discussed with: Patient   Transition of Care Barriers None   OTHER   Name(s) of People in Home Pati Gonzalez (Spouse)   [phone]

## 2023-09-13 NOTE — PLAN OF CARE
Problem: Adult Inpatient Plan of Care  Goal: Plan of Care Review  9/13/2023 0111 by Neel Underwood RN  Outcome: Ongoing, Progressing  9/13/2023 0110 by Neel Underwood RN  Outcome: Ongoing, Progressing  Goal: Patient-Specific Goal (Individualized)  9/13/2023 0111 by Neel Underwood RN  Outcome: Ongoing, Progressing  9/13/2023 0110 by Neel Underwood RN  Outcome: Ongoing, Progressing  Goal: Absence of Hospital-Acquired Illness or Injury  9/13/2023 0111 by Neel Underwood RN  Outcome: Ongoing, Progressing  9/13/2023 0110 by Neel Underwood RN  Outcome: Ongoing, Progressing  Goal: Optimal Comfort and Wellbeing  9/13/2023 0111 by Neel Underwood RN  Outcome: Ongoing, Progressing  9/13/2023 0110 by Neel Underwood RN  Outcome: Ongoing, Progressing  Goal: Readiness for Transition of Care  9/13/2023 0111 by Neel Underwood RN  Outcome: Ongoing, Progressing  9/13/2023 0110 by Neel Underwood RN  Outcome: Ongoing, Progressing     Problem: Pain Chronic (Persistent) (Comorbidity Management)  Goal: Acceptable Pain Control and Functional Ability  Outcome: Ongoing, Progressing     Problem: Pain Acute  Goal: Acceptable Pain Control and Functional Ability  Outcome: Ongoing, Progressing     Problem: Infection  Goal: Absence of Infection Signs and Symptoms  Outcome: Ongoing, Progressing     Problem: Diabetes Comorbidity  Goal: Blood Glucose Level Within Targeted Range  Outcome: Ongoing, Progressing

## 2023-09-13 NOTE — CONSULTS
Consult received for diet choices. Notified dietary to make sure they go over food choices with him before every meal.

## 2023-09-13 NOTE — HPI
Juanpablo Gonzalez is a 38 yr old male with PMH of diabetes, who presents with foot wound.    Pt states that over the past week, his foot has had worsening erythema, swelling, and pain. He was prescribed oral antibiotics, although the pain and swelling persisted, which prompted him to come to the ED for further evaluation.    In the ED, triage vitals were significant for elevated blood pressures, tachycardia. CBC was significant for normoctyic anemia. CMP was significant for hyponatremia and elevated sugars.    XR of ankle and foot was negative for gangrene or fracture.     Medicine was consult for cellulitis, that failed outpatient txt.

## 2023-09-13 NOTE — ED PROVIDER NOTES
"Source of History:  Patient, chart    Chief complaint:  Right foot pain  (Patient recently seen in the ER for the same symptoms. Patient reports pain is worse )      HPI:  Juanpablo Gomez is a 38 y.o. male with medical history of DM, presenting with right foot wound.  Pt states he thinks injury to foot is from work boots rubbing.  Pt states he was seen here 8 days ago and prescribed Keflex.  Pt states pain has worsened and redness has continued.  Pt states he has been compliant with antibiotics.      This is the extent to the patients complaints today here in the emergency department.    ROS: As per HPI and below:  Constitutional: No fever.  No chills.  Eyes: No visual changes.   ENT: No sore throat. No ear pain.  Urinary: No abnormal urination.  MSK: Positive for right foot pain.    Integument: Positive for right foot wound    Review of patient's allergies indicates:  No Known Allergies    PMH:  As per HPI and below:  Past Medical History:   Diagnosis Date    Diabetes mellitus      No past surgical history on file.    Social History     Tobacco Use    Smoking status: Never    Smokeless tobacco: Never   Substance Use Topics    Alcohol use: Not Currently    Drug use: Not Currently       Physical Exam:    BP (!) 141/84   Pulse 105   Temp 98.4 °F (36.9 °C) (Oral)   Resp 18   Ht 5' 5" (1.651 m)   Wt 79.4 kg (175 lb)   SpO2 100%   BMI 29.12 kg/m²   Nursing note and vital signs reviewed.  Constitutional: No acute distress.  Nontoxic  Head:  Normocephalic atraumatic  Eyes: No conjunctival injection.  Extraocular muscles are intact.  ENT: Normal phonation.  Musculoskeletal: TTP to dorsal aspect of right foot.  Slight swelling appreciated.    Skin: Wound noted to dorsal aspect of right foot.  Surrounding erythema and bruising noted.    Psych: Appropriate, conversant.        MDM    Emergent evaluation of a 39 yo male presenting for right foot wound.  Pt states he was seen on 09/04 and started on Keflex for wound.  " Patient states redness, swelling and ecchymosis worsened.  Patient states he has been compliant with medications.  On exam pt is A&Ox3. VSS. Nonfebrile and nontoxic appearing.  Mucous membranes pink and moist.  Pt speaking in full sentences. TTP to dorsal aspect of right foot.  Slight swelling appreciated.  Wound noted to dorsal aspect of right foot.  Surrounding erythema and bruising noted. Steady gait appreciated. Cap refill < 3 seconds.      History Acquisition   Additional history was acquired from other historians.  Chart    The patient's list of active medical problems, social history, medications, and allergies as documented per RN staff has been reviewed.     Differential Diagnoses   Based on available information and the initial assessment, the working differential diagnoses considered during this evaluation include but are not limited to diabetic foot wound, cellulitis, osteomyelitis, others.    I will get labs, imaging, medicate and reassess.  I discussed this case with my supervising physician.      LABS     Labs Reviewed   CBC W/ AUTO DIFFERENTIAL - Abnormal; Notable for the following components:       Result Value    RBC 4.23 (*)     Hemoglobin 12.3 (*)     Hematocrit 35.2 (*)     RDW 11.3 (*)     All other components within normal limits   COMPREHENSIVE METABOLIC PANEL - Abnormal; Notable for the following components:    Sodium 127 (*)     Chloride 93 (*)     CO2 21 (*)     Glucose 418 (*)     BUN 31 (*)     Alkaline Phosphatase 146 (*)     All other components within normal limits   C-REACTIVE PROTEIN - Abnormal; Notable for the following components:    CRP 1.40 (*)     All other components within normal limits   POCT GLUCOSE - Abnormal; Notable for the following components:    POCT Glucose 405 (*)     All other components within normal limits   POCT GLUCOSE MONITORING CONTINUOUS         Imaging     Imaging Results              X-Ray Ankle Complete Right (Final result)  Result time 09/12/23 20:12:06       Final result by Augusto Galaviz MD (09/12/23 20:12:06)                   Impression:      No acute fracture or dislocation      Electronically signed by: Augusto Galaviz  Date:    09/12/2023  Time:    20:12               Narrative:    EXAMINATION:  XR ANKLE COMPLETE 3 VIEW RIGHT    CLINICAL HISTORY:  right foot injury;    TECHNIQUE:  AP, lateral, and oblique images of the right ankle were performed.    COMPARISON:  None    FINDINGS:  No acute fracture or dislocation.  Normal joint spaces.  No soft tissue abnormality.  Achilles insertional tendinopathy.                                       X-Ray Foot Complete Right (Final result)  Result time 09/12/23 20:11:12      Final result by Augusto Galaviz MD (09/12/23 20:11:12)                   Impression:      No acute fracture or dislocation      Electronically signed by: Augusto Galaviz  Date:    09/12/2023  Time:    20:11               Narrative:    EXAMINATION:  XR FOOT COMPLETE 3 VIEW RIGHT    CLINICAL HISTORY:  right foot injury;.    TECHNIQUE:  AP, lateral, and oblique views of the right foot were performed.    COMPARISON:  None    FINDINGS:  No fracture or dislocation.  Achilles insertional tendinopathy.  Prominent medial navicular.                                      A radiology report was available for my review at the time of the encounter.    EKG        Additional Consideration   All available testing was considered during the course of this workup.  Comorbidities taken into consideration during the patient's evaluation and treatment include weight, age.    Social determinants of health were taken into consideration during development of our treatment plan.    Medications   vancomycin (VANCOCIN) 2,000 mg in sodium chloride 0.9% 500 mL IVPB (2,000 mg Intravenous New Bag 9/12/23 2044)   sodium chloride 0.9% bolus 1,000 mL 1,000 mL (1,000 mLs Intravenous New Bag 9/12/23 2130)   ibuprofen tablet 600 mg (600 mg Oral Given 9/12/23 1930)   HYDROcodone-acetaminophen  5-325 mg per tablet 1 tablet (1 tablet Oral Given 9/12/23 2008)   piperacillin-tazobactam (ZOSYN) 4.5 g in sodium chloride 0.9 % 100 mL IVPB (MB+) (0 g Intravenous Stopped 9/12/23 2043)      ED Course as of 09/12/23 2154 Tue Sep 12, 2023   2019 Point of care glucose elevated at 405.  X-rays independently reviewed by myself and Radiology.  Negative for any acute abnormalities.  Awaiting labs. [RZ]   2101 CBC unremarkable.  No leukocytosis noted.  H&H stable at 12.3 and 35.2.  CMP shows hyperglycemia at 4:18 a.m..  Initial sodium of 127 corrected to 135.  BUN elevated at 31.  Creatinine of 1.24.  CRP elevated 1.40.  Will discussed case with hospital medicine for admission due to failed outpatient treatment with mild PETERSON. [RZ]   2153 Discussed case with hospital medicine.  Patient accepted for transfer.  Awaiting bed assignment. [RZ]      ED Course User Index  [RZ] Maggie Mtz NP             CLINICAL IMPRESSION  1. PETERSON (acute kidney injury)    2. Diabetic ulcer of right midfoot associated with type 2 diabetes mellitus, unspecified ulcer stage         ED Disposition Condition    Admit Stable            Instruction:  I see no indication of an emergent process beyond that addressed during our encounter but have duly counseled the patient/family regarding the need for prompt follow-up as well as the indications that should prompt immediate return to the emergency room should new or worrisome developments occur.  The patient/family has been provided with verbal and printed direction regarding our final diagnosis(es) as well as instructions regarding use of OTC and/or Rx medications intended to manage the patient's aforementioned conditions including:  ED Prescriptions    None       Patient has been advised of following recommended follow-up instructions:  Follow-up Information    None       The patient/family communicates understanding of all this information and all remaining questions and concerns were addressed  at this time.      The patient's condition did not warrant review of the  and prescription of controlled substances.       Maggie Mtz, LISA  09/12/23 2381

## 2023-09-13 NOTE — NURSING
Assumed care of patient from [facility] ER, patient is AAOX4, room air, vitals WNL, no current c/o pain or discomfort, pain meds given at previous hospital, all safety precautions are in place, call bell and tray table are within reach of the patient, no additional questions or concerns at this time.

## 2023-09-13 NOTE — ASSESSMENT & PLAN NOTE
Patient has hyponatremia which is controlled,We will aim to correct the sodium by 4-6mEq in 24 hours. We will monitor sodium Daily. The hyponatremia is due to Dehydration/hypovolemia. We will obtain the following studies: none. We will treat the hyponatremia with IV fluids as follows: s/p fluids in ED. Encourage oral intake. The patient's sodium results have been reviewed and are listed below.  Recent Labs   Lab 09/13/23  0448   *

## 2023-09-14 LAB
ANION GAP SERPL CALC-SCNC: 9 MMOL/L (ref 8–16)
BASOPHILS # BLD AUTO: 0.03 K/UL (ref 0–0.2)
BASOPHILS NFR BLD: 0.5 % (ref 0–1.9)
BUN SERPL-MCNC: 15 MG/DL (ref 6–20)
CALCIUM SERPL-MCNC: 8.6 MG/DL (ref 8.7–10.5)
CHLORIDE SERPL-SCNC: 102 MMOL/L (ref 95–110)
CO2 SERPL-SCNC: 24 MMOL/L (ref 23–29)
CREAT SERPL-MCNC: 0.8 MG/DL (ref 0.5–1.4)
DIFFERENTIAL METHOD: ABNORMAL
EOSINOPHIL # BLD AUTO: 0.2 K/UL (ref 0–0.5)
EOSINOPHIL NFR BLD: 2.3 % (ref 0–8)
ERYTHROCYTE [DISTWIDTH] IN BLOOD BY AUTOMATED COUNT: 11.4 % (ref 11.5–14.5)
EST. GFR  (NO RACE VARIABLE): >60 ML/MIN/1.73 M^2
GLUCOSE SERPL-MCNC: 304 MG/DL (ref 70–110)
HCT VFR BLD AUTO: 33.1 % (ref 40–54)
HGB BLD-MCNC: 12.1 G/DL (ref 14–18)
IMM GRANULOCYTES # BLD AUTO: 0.01 K/UL (ref 0–0.04)
IMM GRANULOCYTES NFR BLD AUTO: 0.2 % (ref 0–0.5)
LYMPHOCYTES # BLD AUTO: 2.2 K/UL (ref 1–4.8)
LYMPHOCYTES NFR BLD: 33.4 % (ref 18–48)
MCH RBC QN AUTO: 30 PG (ref 27–31)
MCHC RBC AUTO-ENTMCNC: 36.6 G/DL (ref 32–36)
MCV RBC AUTO: 82 FL (ref 82–98)
MONOCYTES # BLD AUTO: 0.6 K/UL (ref 0.3–1)
MONOCYTES NFR BLD: 8.5 % (ref 4–15)
NEUTROPHILS # BLD AUTO: 3.6 K/UL (ref 1.8–7.7)
NEUTROPHILS NFR BLD: 55.1 % (ref 38–73)
NRBC BLD-RTO: 0 /100 WBC
PLATELET # BLD AUTO: 263 K/UL (ref 150–450)
PMV BLD AUTO: 10.3 FL (ref 9.2–12.9)
POCT GLUCOSE: 234 MG/DL (ref 70–110)
POCT GLUCOSE: 245 MG/DL (ref 70–110)
POCT GLUCOSE: 264 MG/DL (ref 70–110)
POCT GLUCOSE: 271 MG/DL (ref 70–110)
POTASSIUM SERPL-SCNC: 3.7 MMOL/L (ref 3.5–5.1)
RBC # BLD AUTO: 4.04 M/UL (ref 4.6–6.2)
SODIUM SERPL-SCNC: 135 MMOL/L (ref 136–145)
VANCOMYCIN TROUGH SERPL-MCNC: 5.1 UG/ML (ref 10–22)
WBC # BLD AUTO: 6.58 K/UL (ref 3.9–12.7)

## 2023-09-14 PROCEDURE — 85025 COMPLETE CBC W/AUTO DIFF WBC: CPT | Performed by: STUDENT IN AN ORGANIZED HEALTH CARE EDUCATION/TRAINING PROGRAM

## 2023-09-14 PROCEDURE — 25000003 PHARM REV CODE 250: Performed by: HOSPITALIST

## 2023-09-14 PROCEDURE — 99223 PR INITIAL HOSPITAL CARE,LEVL III: ICD-10-PCS | Mod: ,,, | Performed by: STUDENT IN AN ORGANIZED HEALTH CARE EDUCATION/TRAINING PROGRAM

## 2023-09-14 PROCEDURE — 11000001 HC ACUTE MED/SURG PRIVATE ROOM

## 2023-09-14 PROCEDURE — 63600175 PHARM REV CODE 636 W HCPCS: Performed by: HOSPITALIST

## 2023-09-14 PROCEDURE — 99900035 HC TECH TIME PER 15 MIN (STAT)

## 2023-09-14 PROCEDURE — 80202 ASSAY OF VANCOMYCIN: CPT | Performed by: HOSPITALIST

## 2023-09-14 PROCEDURE — 36415 COLL VENOUS BLD VENIPUNCTURE: CPT | Performed by: STUDENT IN AN ORGANIZED HEALTH CARE EDUCATION/TRAINING PROGRAM

## 2023-09-14 PROCEDURE — 94761 N-INVAS EAR/PLS OXIMETRY MLT: CPT

## 2023-09-14 PROCEDURE — 80048 BASIC METABOLIC PNL TOTAL CA: CPT | Performed by: STUDENT IN AN ORGANIZED HEALTH CARE EDUCATION/TRAINING PROGRAM

## 2023-09-14 PROCEDURE — 25000003 PHARM REV CODE 250: Performed by: STUDENT IN AN ORGANIZED HEALTH CARE EDUCATION/TRAINING PROGRAM

## 2023-09-14 PROCEDURE — 36415 COLL VENOUS BLD VENIPUNCTURE: CPT | Performed by: HOSPITALIST

## 2023-09-14 PROCEDURE — 99223 1ST HOSP IP/OBS HIGH 75: CPT | Mod: ,,, | Performed by: STUDENT IN AN ORGANIZED HEALTH CARE EDUCATION/TRAINING PROGRAM

## 2023-09-14 RX ORDER — SODIUM CHLORIDE 9 MG/ML
INJECTION, SOLUTION INTRAVENOUS
Status: DISCONTINUED | OUTPATIENT
Start: 2023-09-14 | End: 2023-09-17 | Stop reason: HOSPADM

## 2023-09-14 RX ADMIN — ATORVASTATIN CALCIUM 40 MG: 40 TABLET, FILM COATED ORAL at 08:09

## 2023-09-14 RX ADMIN — PIPERACILLIN AND TAZOBACTAM 4.5 G: 4; .5 INJECTION, POWDER, LYOPHILIZED, FOR SOLUTION INTRAVENOUS; PARENTERAL at 10:09

## 2023-09-14 RX ADMIN — VANCOMYCIN HYDROCHLORIDE 1750 MG: 500 INJECTION, POWDER, LYOPHILIZED, FOR SOLUTION INTRAVENOUS at 09:09

## 2023-09-14 RX ADMIN — INSULIN ASPART 2 UNITS: 100 INJECTION, SOLUTION INTRAVENOUS; SUBCUTANEOUS at 01:09

## 2023-09-14 RX ADMIN — SODIUM CHLORIDE: 9 INJECTION, SOLUTION INTRAVENOUS at 09:09

## 2023-09-14 RX ADMIN — INSULIN ASPART 6 UNITS: 100 INJECTION, SOLUTION INTRAVENOUS; SUBCUTANEOUS at 01:09

## 2023-09-14 RX ADMIN — INSULIN ASPART 6 UNITS: 100 INJECTION, SOLUTION INTRAVENOUS; SUBCUTANEOUS at 05:09

## 2023-09-14 RX ADMIN — PIPERACILLIN AND TAZOBACTAM 4.5 G: 4; .5 INJECTION, POWDER, LYOPHILIZED, FOR SOLUTION INTRAVENOUS; PARENTERAL at 01:09

## 2023-09-14 RX ADMIN — INSULIN ASPART 2 UNITS: 100 INJECTION, SOLUTION INTRAVENOUS; SUBCUTANEOUS at 05:09

## 2023-09-14 RX ADMIN — INSULIN ASPART 3 UNITS: 100 INJECTION, SOLUTION INTRAVENOUS; SUBCUTANEOUS at 06:09

## 2023-09-14 RX ADMIN — VANCOMYCIN HYDROCHLORIDE 1750 MG: 500 INJECTION, POWDER, LYOPHILIZED, FOR SOLUTION INTRAVENOUS at 08:09

## 2023-09-14 RX ADMIN — INSULIN ASPART 6 UNITS: 100 INJECTION, SOLUTION INTRAVENOUS; SUBCUTANEOUS at 08:09

## 2023-09-14 RX ADMIN — LISINOPRIL 10 MG: 10 TABLET ORAL at 08:09

## 2023-09-14 RX ADMIN — ACETAMINOPHEN 650 MG: 325 TABLET ORAL at 05:09

## 2023-09-14 RX ADMIN — PIPERACILLIN AND TAZOBACTAM 4.5 G: 4; .5 INJECTION, POWDER, LYOPHILIZED, FOR SOLUTION INTRAVENOUS; PARENTERAL at 03:09

## 2023-09-14 NOTE — HPI
Pt is a 39 y/o male  has a past medical history of Diabetes mellitus. Admitted and consulted to podiatry for cellulitis of lower extremity. Seen at bedside. Relates pain is resolving. No further pedal comlaints.

## 2023-09-14 NOTE — ASSESSMENT & PLAN NOTE
Lab Results   Component Value Date    HGBA1C 12.9 (H) 09/13/2023     Recent Labs   Lab 09/14/23  0602   POCTGLUCOSE 264*        Hold home oral agents- insulin is the preferred treatment for hyperglycemia  Cont current basal/prandial insulin regimen   Low dose correction scale   Cont blood glucose monitoring   BG goal:  Preprandial blood glucose target <140 mg/dL  Random glucoses <180 mg/dL  Avoid hypoglycemia -  Reduce antihyperglycemic therapy when caloric intake is reduced. Avoid insulin stacking as a result of repeated injection of prandial insulin at close intervals.   Avoid severe hyperglycemia  ADA diet  Antihyperglycemics (From admission, onward)    Start     Stop Route Frequency Ordered    09/14/23 0715  insulin aspart U-100 pen 6 Units         -- SubQ 3 times daily with meals 09/13/23 1746 09/13/23 2100  insulin detemir U-100 (Levemir) pen 14 Units         -- SubQ Nightly 09/13/23 1746    09/13/23 0159  insulin aspart U-100 pen 0-5 Units         -- SubQ Before meals & nightly PRN 09/13/23 0100

## 2023-09-14 NOTE — SUBJECTIVE & OBJECTIVE
Interval History: seen and examined at bedside. Stated that erythema and swelling improved. Pending MRI of foot. Podiatry and ID following.     Review of Systems   Constitutional:  Negative for chills, fatigue and fever.   HENT: Negative.     Eyes: Negative.    Respiratory:  Negative for cough and shortness of breath.    Cardiovascular:  Negative for chest pain, palpitations and leg swelling.   Gastrointestinal:  Negative for abdominal pain and vomiting.   Genitourinary: Negative.    Skin: Negative.    Neurological:  Negative for seizures and speech difficulty.   Psychiatric/Behavioral:  Negative for agitation and confusion. The patient is not nervous/anxious.      Objective:     Vital Signs (Most Recent):  Temp: 98.1 °F (36.7 °C) (09/14/23 0818)  Pulse: 87 (09/14/23 0818)  Resp: 20 (09/14/23 0818)  BP: (!) 158/105 (09/14/23 0818)  SpO2: 99 % (09/14/23 0818) Vital Signs (24h Range):  Temp:  [97.9 °F (36.6 °C)-98.5 °F (36.9 °C)] 98.1 °F (36.7 °C)  Pulse:  [] 87  Resp:  [14-20] 20  SpO2:  [96 %-99 %] 99 %  BP: (135-167)/() 158/105     Weight: 79.2 kg (174 lb 9.7 oz)  Body mass index is 29.06 kg/m².    Intake/Output Summary (Last 24 hours) at 9/14/2023 0842  Last data filed at 9/14/2023 0730  Gross per 24 hour   Intake 1829.04 ml   Output 802 ml   Net 1027.04 ml         Physical Exam  Vitals and nursing note reviewed.   Constitutional:       General: He is awake. He is not in acute distress.     Appearance: Normal appearance. He is well-developed and well-groomed. He is not ill-appearing, toxic-appearing or diaphoretic.   HENT:      Head: Normocephalic and atraumatic.   Cardiovascular:      Rate and Rhythm: Normal rate.   Pulmonary:      Effort: Pulmonary effort is normal. No tachypnea, accessory muscle usage or respiratory distress.   Neurological:      Mental Status: He is alert and oriented to person, place, and time. Mental status is at baseline.   Psychiatric:         Attention and Perception:  Attention normal. He is attentive.         Mood and Affect: Mood normal. Mood is not anxious.         Speech: Speech normal.         Behavior: Behavior normal. Behavior is cooperative.         Thought Content: Thought content normal.         Cognition and Memory: Cognition and memory normal. Cognition is not impaired. Memory is not impaired. He does not exhibit impaired recent memory.         Judgment: Judgment normal.             Significant Labs: All pertinent labs within the past 24 hours have been reviewed.    Significant Imaging: I have reviewed all pertinent imaging results/findings within the past 24 hours.

## 2023-09-14 NOTE — PLAN OF CARE
Problem: Adult Inpatient Plan of Care  Goal: Plan of Care Review  Outcome: Ongoing, Progressing  Goal: Patient-Specific Goal (Individualized)  Outcome: Ongoing, Progressing  Goal: Absence of Hospital-Acquired Illness or Injury  Outcome: Ongoing, Progressing  Goal: Optimal Comfort and Wellbeing  Outcome: Ongoing, Progressing  Goal: Readiness for Transition of Care  Outcome: Ongoing, Progressing     Problem: Pain Chronic (Persistent) (Comorbidity Management)  Goal: Acceptable Pain Control and Functional Ability  Outcome: Ongoing, Progressing     Problem: Pain Acute  Goal: Acceptable Pain Control and Functional Ability  Outcome: Ongoing, Progressing     Problem: Fall Injury Risk  Goal: Absence of Fall and Fall-Related Injury  Outcome: Ongoing, Progressing     Problem: Infection  Goal: Absence of Infection Signs and Symptoms  Outcome: Ongoing, Progressing     Problem: Diabetes Comorbidity  Goal: Blood Glucose Level Within Targeted Range  Outcome: Ongoing, Progressing     Problem: Skin or Soft Tissue Infection  Goal: Absence of Infection Signs and Symptoms  Outcome: Ongoing, Progressing

## 2023-09-14 NOTE — ASSESSMENT & PLAN NOTE
"Temp Readings from Last 3 Encounters:   09/14/23 98.1 °F (36.7 °C)   09/04/23 98.2 °F (36.8 °C) (Oral)     Lab Results   Component Value Date    WBC 6.58 09/14/2023     No results for input(s): "LACTATE" in the last 72 hours.    Persistent skin infection, with edema, erythema, despite keflex use  Xray of right foot/ankle negative   ID consulted and following: notes reviewed  Podiatry consulted for possible surgical debridement  MRI pending    Antibiotics given-   Antibiotics (From admission, onward)    Start     Stop Route Frequency Ordered    09/13/23 0900  vancomycin (VANCOCIN) 1,000 mg in dextrose 5 % (D5W) 250 mL IVPB (Vial-Mate)         -- IV Every 12 hours (non-standard times) 09/13/23 0114    09/13/23 0400  piperacillin-tazobactam (ZOSYN) 4.5 g in dextrose 5 % in water (D5W) 100 mL IVPB (MB+)         -- IV Every 8 hours (non-standard times) 09/13/23 0104    09/13/23 0156  vancomycin - pharmacy to dose  (vancomycin IVPB (PEDS and ADULTS))        See Hyperspace for full Linked Orders Report.    -- IV pharmacy to manage frequency 09/13/23 0100        Cultures were taken-   Microbiology Results (last 7 days)     ** No results found for the last 168 hours. **        "

## 2023-09-14 NOTE — PROGRESS NOTES
[facility] ID note    Patient afebrile.  WBC 6.6.  On vancomycin and piperacillin tazobactam.    MRI of foot pending official reading    Continue present antibiotics  We will adjust based upon progress and MRI findings  Would like to have deep tissue culture if long-term antibiotics are anticipated    Please call if any questions   Keyshawn White  [facility] ID  [phone]

## 2023-09-14 NOTE — PROGRESS NOTES
Pharmacokinetic Assessment Follow Up: IV Vancomycin    Vancomycin serum concentration assessment(s):    The trough level was drawn correctly and can be used to guide therapy at this time. The measurement is below the desired definitive target range of 10 to 15 mcg/mL.    Vancomycin Regimen Plan:    Change regimen to Vancomycin 1750 mg IV every 12 hours with next serum trough concentration measured at 0800 prior to 3d dose on 9/15.     With patient's age and renal function, may benefit from q 8 hour dosing - will follow up level tomorrow morning    Drug levels (last 3 results):  Recent Labs   Lab Result Units 09/14/23  0813   Vancomycin-Trough ug/mL 5.1*       Pharmacy will continue to follow and monitor vancomycin.    Please contact pharmacy at extension [phone]   for questions regarding this assessment.    Thank you for the consult,   Carmita Ryan       Patient brief summary:  Juanpablo Gomez is a 38 y.o. male initiated on antimicrobial therapy with IV Vancomycin for treatment of skin & soft tissue infection    The patient's current regimen is vancomycin 1000 mg IV q 12 hours    Drug Allergies:   Review of patient's allergies indicates:  No Known Allergies    Actual Body Weight:   79.2 kg    Renal Function:   Estimated Creatinine Clearance: 122.7 mL/min (based on SCr of 0.8 mg/dL).,     Dialysis Method (if applicable):  N/A    CBC (last 72 hours):  Recent Labs   Lab Result Units 09/12/23 2013 09/13/23 0448 09/14/23  0438   WBC K/uL 9.78 7.97 6.58   Hemoglobin g/dL 12.3* 11.7* 12.1*   Hemoglobin A1C %  --  12.9*  --    Hematocrit % 35.2* 32.8* 33.1*   Platelets K/uL 298 254 263   Gran % % 56.8 50.4 55.1   Lymph % % 33.9 37.3 33.4   Mono % % 7.2 9.2 8.5   Eosinophil % % 1.6 2.4 2.3   Basophil % % 0.3 0.4 0.5   Differential Method  Automated Automated Automated       Metabolic Panel (last 72 hours):  Recent Labs   Lab Result Units 09/12/23 2013 09/13/23 0448 09/13/23  1710 09/14/23  0438   Sodium mmol/L 127*  134*  --  135*   Potassium mmol/L 4.3 3.8  --  3.7   Chloride mmol/L 93* 102  --  102   CO2 mmol/L 21* 24  --  24   Glucose mg/dL 418* 399*  --  304*   Glucose, UA   --   --  4+*  --    BUN mg/dL 31* 24*  --  15   Creatinine mg/dL 1.24 1.0  --  0.8   Albumin g/dL 3.8  --   --   --    Total Bilirubin mg/dL 0.4  --   --   --    Alkaline Phosphatase U/L 146*  --   --   --    AST U/L 18  --   --   --    ALT U/L 24  --   --   --        Vancomycin Administrations:  vancomycin given in the last 96 hours                     vancomycin (VANCOCIN) 1,000 mg in dextrose 5 % (D5W) 250 mL IVPB (Vial-Mate) (mg) 1,000 mg New Bag 09/13/23 2202     1,000 mg New Bag  0856    vancomycin (VANCOCIN) 2,000 mg in sodium chloride 0.9% 500 mL IVPB (mg) 2,000 mg New Bag 09/12/23 2044                    Microbiologic Results:  Microbiology Results (last 7 days)       ** No results found for the last 168 hours. **

## 2023-09-14 NOTE — NURSING
Assumed care of patient from APRIL Madden patient is AAOX4, room air, vitals WNL, no current c/o pain or discomfort, antibiotics running, cleaned top of right foot and applied betadine, all safety precautions are in place, call bell and tray table are within reach of the patient, no additional questions or concerns at this time.

## 2023-09-14 NOTE — ASSESSMENT & PLAN NOTE
Xray reviewed, MRI ordered  Cellulitis appears to be resolving on antibiotics, continue antibiotic management per Infectious Disease recommendations  Rest, elevate  Consider debridement pending MRI results   WBAT  Nursing orders in for dressing changes  Will follow

## 2023-09-14 NOTE — CONSULTS
[facility]  Podiatry  Consult Note    Patient Name: Juanpablo Gomez  MRN: [id number]  Admission Date: 9/12/2023  Hospital Length of Stay: 2 days  Attending Physician: Amira Sutton MD  Primary Care Provider: Radha, Primary Doctor     Inpatient consult to Podiatry  Consult performed by: Nicolasa Michaels DPM  Consult ordered by: Irene Rojas MD        Subjective:     History of Present Illness:  Pt is a 37 y/o male  has a past medical history of Diabetes mellitus. Admitted and consulted to podiatry for cellulitis of lower extremity. Seen at bedside. Relates pain is resolving. No further pedal comlaints.       Scheduled Meds:   atorvastatin  40 mg Oral Daily    insulin aspart U-100  6 Units Subcutaneous TIDWM    insulin detemir U-100  14 Units Subcutaneous QHS    lisinopriL  10 mg Oral Daily    piperacillin-tazobactam (Zosyn) IV (PEDS and ADULTS) (extended infusion is not appropriate)  4.5 g Intravenous Q8H    vancomycin (VANCOCIN) IV (PEDS and ADULTS)  1,750 mg Intravenous Q12H     Continuous Infusions:  PRN Meds:sodium chloride 0.9%, acetaminophen, dextrose 10%, dextrose 10%, glucagon (human recombinant), glucose, glucose, HYDROcodone-acetaminophen, insulin aspart U-100, melatonin, melatonin, naloxone, ondansetron, simethicone, sodium chloride 0.9%, sodium chloride 0.9%, Pharmacy to dose Vancomycin consult **AND** vancomycin - pharmacy to dose    Review of patient's allergies indicates:  No Known Allergies     Past Medical History:   Diagnosis Date    Diabetes mellitus      No past surgical history on file.    Family History    None       Tobacco Use    Smoking status: Never    Smokeless tobacco: Never   Substance and Sexual Activity    Alcohol use: Not Currently    Drug use: Not Currently    Sexual activity: Not on file     Review of Systems   Constitutional:  Negative for activity change, chills, diaphoresis and fever.   HENT:  Negative for congestion and hearing loss.    Respiratory:   Negative for cough and shortness of breath.    Cardiovascular:  Negative for leg swelling.   Gastrointestinal:  Negative for nausea and vomiting.   Musculoskeletal:  Positive for gait problem. Negative for back pain, joint swelling and myalgias.   Skin:  Positive for color change and wound. Negative for pallor and rash.   Neurological:  Positive for numbness. Negative for tremors, speech difficulty and weakness.   Psychiatric/Behavioral:  Negative for agitation and confusion. The patient is not nervous/anxious.      Objective:     Vital Signs (Most Recent):  Temp: 98.1 °F (36.7 °C) (09/14/23 0818)  Pulse: 87 (09/14/23 0818)  Resp: 20 (09/14/23 0818)  BP: (!) 158/105 (09/14/23 0818)  SpO2: 99 % (09/14/23 0818) Vital Signs (24h Range):  Temp:  [97.9 °F (36.6 °C)-98.5 °F (36.9 °C)] 98.1 °F (36.7 °C)  Pulse:  [] 87  Resp:  [14-20] 20  SpO2:  [96 %-99 %] 99 %  BP: (135-158)/() 158/105     Weight: 79.2 kg (174 lb 9.7 oz)  Body mass index is 29.06 kg/m².    Foot Exam    General  General Appearance: appears stated age and healthy   Orientation: alert and oriented to person, place, and time   Affect: appropriate       Right Foot/Ankle     Inspection and Palpation  Ecchymosis: none  Tenderness: none   Swelling: none   Skin Exam: skin intact;     Neurovascular  Dorsalis pedis: 1+  Posterior tibial: 1+  Saphenous nerve sensation: diminished  Tibial nerve sensation: diminished  Superficial peroneal nerve sensation: diminished  Deep peroneal nerve sensation: diminished  Sural nerve sensation: diminished      Left Foot/Ankle      Inspection and Palpation  Ecchymosis: none  Tenderness: none   Swelling: none   Skin Exam: cellulitis and ulcer;     Neurovascular  Dorsalis pedis: 1+  Posterior tibial: 1+  Saphenous nerve sensation: diminished  Tibial nerve sensation: diminished  Superficial peroneal nerve sensation: diminished  Deep peroneal nerve sensation: diminished  Sural nerve sensation:  "diminished          Laboratory:  A1C:   Recent Labs   Lab 09/13/23 0448   HGBA1C 12.9*     Blood Cultures: No results for input(s): "LABBLOO" in the last 48 hours.  CBC:   Recent Labs   Lab 09/14/23 0438   WBC 6.58   RBC 4.04*   HGB 12.1*   HCT 33.1*      MCV 82   MCH 30.0   MCHC 36.6*     CMP:   Recent Labs   Lab 09/12/23 2013 09/13/23 0448 09/14/23 0438   *   < > 304*   CALCIUM 9.1   < > 8.6*   ALBUMIN 3.8  --   --    PROT 7.6  --   --    *   < > 135*   K 4.3   < > 3.7   CO2 21*   < > 24   CL 93*   < > 102   BUN 31*   < > 15   CREATININE 1.24   < > 0.8   ALKPHOS 146*  --   --    ALT 24  --   --    AST 18  --   --    BILITOT 0.4  --   --     < > = values in this interval not displayed.     CRP:   Recent Labs   Lab 09/12/23 2013   CRP 1.40*     ESR: No results for input(s): "SEDRATE" in the last 168 hours.  Wound Cultures: No results for input(s): "LABAERO" in the last 4320 hours.  Microbiology Results (last 7 days)       ** No results found for the last 168 hours. **          Specimen (24h ago, onward)      None            Diagnostic Results:  MRI: I have reviewed all pertinent results/findings within the past 24 hours.  ordered  X-Ray: I have reviewed all pertinent results/findings within the past 24 hours.  reviewed    Clinical Findings:    Eschar to dorsal right foot with resolving periwound erythema and edema, no fluctuance or crepitus on exam. Mild tenderness on palpation.     Distal eschar noted to right 2nd toe, no drainage or signs of infection this location     Previous Images:        Assessment/Plan:     Renal/  Hyponatremia  Management per Hospital Medicine    ID  * Cellulitis  Xray reviewed, MRI ordered  Cellulitis appears to be resolving on antibiotics, continue antibiotic management per Infectious Disease recommendations  Rest, elevate  Consider debridement pending MRI results   WBAT  Nursing orders in for dressing changes  Will follow    Oncology  Normocytic " anemia  Management per Hospital Medicine    Endocrine  Type 2 diabetes mellitus with circulatory disorder, without long-term current use of insulin  Management per Hospital Medicine    Other  Elevated alkaline phosphatase level  Management per Hospital Medicine        Thank you for your consult. I will follow-up with patient. Please contact us if you have any additional questions.    Nicolasa Michaels DPM  Podiatry  [facility]

## 2023-09-14 NOTE — SUBJECTIVE & OBJECTIVE
Scheduled Meds:   atorvastatin  40 mg Oral Daily    insulin aspart U-100  6 Units Subcutaneous TIDWM    insulin detemir U-100  14 Units Subcutaneous QHS    lisinopriL  10 mg Oral Daily    piperacillin-tazobactam (Zosyn) IV (PEDS and ADULTS) (extended infusion is not appropriate)  4.5 g Intravenous Q8H    vancomycin (VANCOCIN) IV (PEDS and ADULTS)  1,750 mg Intravenous Q12H     Continuous Infusions:  PRN Meds:sodium chloride 0.9%, acetaminophen, dextrose 10%, dextrose 10%, glucagon (human recombinant), glucose, glucose, HYDROcodone-acetaminophen, insulin aspart U-100, melatonin, melatonin, naloxone, ondansetron, simethicone, sodium chloride 0.9%, sodium chloride 0.9%, Pharmacy to dose Vancomycin consult **AND** vancomycin - pharmacy to dose    Review of patient's allergies indicates:  No Known Allergies     Past Medical History:   Diagnosis Date    Diabetes mellitus      No past surgical history on file.    Family History    None       Tobacco Use    Smoking status: Never    Smokeless tobacco: Never   Substance and Sexual Activity    Alcohol use: Not Currently    Drug use: Not Currently    Sexual activity: Not on file     Review of Systems   Constitutional:  Negative for activity change, chills, diaphoresis and fever.   HENT:  Negative for congestion and hearing loss.    Respiratory:  Negative for cough and shortness of breath.    Cardiovascular:  Negative for leg swelling.   Gastrointestinal:  Negative for nausea and vomiting.   Musculoskeletal:  Positive for gait problem. Negative for back pain, joint swelling and myalgias.   Skin:  Positive for color change and wound. Negative for pallor and rash.   Neurological:  Positive for numbness. Negative for tremors, speech difficulty and weakness.   Psychiatric/Behavioral:  Negative for agitation and confusion. The patient is not nervous/anxious.      Objective:     Vital Signs (Most Recent):  Temp: 98.1 °F (36.7 °C) (09/14/23 0818)  Pulse: 87 (09/14/23 0818)  Resp: 20  "(09/14/23 0818)  BP: (!) 158/105 (09/14/23 0818)  SpO2: 99 % (09/14/23 0818) Vital Signs (24h Range):  Temp:  [97.9 °F (36.6 °C)-98.5 °F (36.9 °C)] 98.1 °F (36.7 °C)  Pulse:  [] 87  Resp:  [14-20] 20  SpO2:  [96 %-99 %] 99 %  BP: (135-158)/() 158/105     Weight: 79.2 kg (174 lb 9.7 oz)  Body mass index is 29.06 kg/m².    Foot Exam    General  General Appearance: appears stated age and healthy   Orientation: alert and oriented to person, place, and time   Affect: appropriate       Right Foot/Ankle     Inspection and Palpation  Ecchymosis: none  Tenderness: none   Swelling: none   Skin Exam: skin intact;     Neurovascular  Dorsalis pedis: 1+  Posterior tibial: 1+  Saphenous nerve sensation: diminished  Tibial nerve sensation: diminished  Superficial peroneal nerve sensation: diminished  Deep peroneal nerve sensation: diminished  Sural nerve sensation: diminished      Left Foot/Ankle      Inspection and Palpation  Ecchymosis: none  Tenderness: none   Swelling: none   Skin Exam: cellulitis and ulcer;     Neurovascular  Dorsalis pedis: 1+  Posterior tibial: 1+  Saphenous nerve sensation: diminished  Tibial nerve sensation: diminished  Superficial peroneal nerve sensation: diminished  Deep peroneal nerve sensation: diminished  Sural nerve sensation: diminished          Laboratory:  A1C:   Recent Labs   Lab 09/13/23 0448   HGBA1C 12.9*     Blood Cultures: No results for input(s): "LABBLOO" in the last 48 hours.  CBC:   Recent Labs   Lab 09/14/23 0438   WBC 6.58   RBC 4.04*   HGB 12.1*   HCT 33.1*      MCV 82   MCH 30.0   MCHC 36.6*     CMP:   Recent Labs   Lab 09/12/23 2013 09/13/23 0448 09/14/23 0438   *   < > 304*   CALCIUM 9.1   < > 8.6*   ALBUMIN 3.8  --   --    PROT 7.6  --   --    *   < > 135*   K 4.3   < > 3.7   CO2 21*   < > 24   CL 93*   < > 102   BUN 31*   < > 15   CREATININE 1.24   < > 0.8   ALKPHOS 146*  --   --    ALT 24  --   --    AST 18  --   --    BILITOT 0.4  --   --  " "   < > = values in this interval not displayed.     CRP:   Recent Labs   Lab 09/12/23 2013   CRP 1.40*     ESR: No results for input(s): "SEDRATE" in the last 168 hours.  Wound Cultures: No results for input(s): "LABAERO" in the last 4320 hours.  Microbiology Results (last 7 days)       ** No results found for the last 168 hours. **          Specimen (24h ago, onward)      None            Diagnostic Results:  MRI: I have reviewed all pertinent results/findings within the past 24 hours.  ordered  X-Ray: I have reviewed all pertinent results/findings within the past 24 hours.  reviewed    Clinical Findings:    Eschar to dorsal right foot with resolving periwound erythema and edema, no fluctuance or crepitus on exam. Mild tenderness on palpation.     Distal eschar noted to right 2nd toe, no drainage or signs of infection this location     Previous Images:      "

## 2023-09-14 NOTE — ASSESSMENT & PLAN NOTE
Lab Results   Component Value Date    HGB 12.1 (L) 09/14/2023    HCT 33.1 (L) 09/14/2023     Cont to monitor  Transfuse if patient becomes hemodynamically unstable, symptomatic or H/H drops below 7/21.

## 2023-09-14 NOTE — CONSULTS
Patient with DM2 admitted with infection of right foot wound/cellulitis.  Patient states has had DM2 for approximately 2 years now and takes Amaryl and Metformin as prescribed.  He states he has a blood sugar machine and was checking his blood sugars routinely until some time ago and has not been following his diet as he should.  Per patient, he did not understand really what Diabetes is.  Patient stated that he understands English very well but would prefer written education be given to him in Malay so all education given was in Malay.  Verbal/written education done with patient on what is diabetes, Hyper/hypoglycemia signs/symptoms and treatment, Carbohydrate Counting diet with example diet plans/how to count carbohydrates, physical activity, importance of blood sugar monitoring/follow up with PCP for Diabetic Management, log-term risks of complications, and importance of taking medications as prescribed.  Patient with verbal understanding of all education.  Patient stated will comply with all of the Diabetes management regimen.  Phone number provided if any questions.

## 2023-09-14 NOTE — PROGRESS NOTES
[facility]  Telemedicine Progress Note    Patient Name: Juanpablo Gomez  MRN: [id number]  Patient Class: IP- Inpatient   Admission Date: 9/12/2023  Length of Stay: 2 days  Attending Physician: Amira Sutton MD  Primary Care Provider: Radha, Primary Doctor          Subjective:     Principal Problem:Cellulitis        HPI:  Juanpablo Gonzalez is a 38 yr old male with PMH of diabetes, who presents with foot wound.    Pt states that over the past week, his foot has had worsening erythema, swelling, and pain. He was prescribed oral antibiotics, although the pain and swelling persisted, which prompted him to come to the ED for further evaluation.    In the ED, triage vitals were significant for elevated blood pressures, tachycardia. CBC was significant for normoctyic anemia. CMP was significant for hyponatremia and elevated sugars.    XR of ankle and foot was negative for gangrene or fracture.     Medicine was consult for cellulitis, that failed outpatient txt.       Overview/Hospital Course:  No notes on file    Interval History: seen and examined at bedside. Stated that erythema and swelling improved. Pending MRI of foot. Podiatry and ID following.     Review of Systems   Constitutional:  Negative for chills, fatigue and fever.   HENT: Negative.     Eyes: Negative.    Respiratory:  Negative for cough and shortness of breath.    Cardiovascular:  Negative for chest pain, palpitations and leg swelling.   Gastrointestinal:  Negative for abdominal pain and vomiting.   Genitourinary: Negative.    Skin: Negative.    Neurological:  Negative for seizures and speech difficulty.   Psychiatric/Behavioral:  Negative for agitation and confusion. The patient is not nervous/anxious.      Objective:     Vital Signs (Most Recent):  Temp: 98.1 °F (36.7 °C) (09/14/23 0818)  Pulse: 87 (09/14/23 0818)  Resp: 20 (09/14/23 0818)  BP: (!) 158/105 (09/14/23 0818)  SpO2: 99 % (09/14/23 0818) Vital Signs (24h Range):  Temp:   "[97.9 °F (36.6 °C)-98.5 °F (36.9 °C)] 98.1 °F (36.7 °C)  Pulse:  [] 87  Resp:  [14-20] 20  SpO2:  [96 %-99 %] 99 %  BP: (135-167)/() 158/105     Weight: 79.2 kg (174 lb 9.7 oz)  Body mass index is 29.06 kg/m².    Intake/Output Summary (Last 24 hours) at 9/14/2023 0842  Last data filed at 9/14/2023 0730  Gross per 24 hour   Intake 1829.04 ml   Output 802 ml   Net 1027.04 ml         Physical Exam  Vitals and nursing note reviewed.   Constitutional:       General: He is awake. He is not in acute distress.     Appearance: Normal appearance. He is well-developed and well-groomed. He is not ill-appearing, toxic-appearing or diaphoretic.   HENT:      Head: Normocephalic and atraumatic.   Cardiovascular:      Rate and Rhythm: Normal rate.   Pulmonary:      Effort: Pulmonary effort is normal. No tachypnea, accessory muscle usage or respiratory distress.   Neurological:      Mental Status: He is alert and oriented to person, place, and time. Mental status is at baseline.   Psychiatric:         Attention and Perception: Attention normal. He is attentive.         Mood and Affect: Mood normal. Mood is not anxious.         Speech: Speech normal.         Behavior: Behavior normal. Behavior is cooperative.         Thought Content: Thought content normal.         Cognition and Memory: Cognition and memory normal. Cognition is not impaired. Memory is not impaired. He does not exhibit impaired recent memory.         Judgment: Judgment normal.             Significant Labs: All pertinent labs within the past 24 hours have been reviewed.    Significant Imaging: I have reviewed all pertinent imaging results/findings within the past 24 hours.      Assessment/Plan:      * Cellulitis  Temp Readings from Last 3 Encounters:   09/14/23 98.1 °F (36.7 °C)   09/04/23 98.2 °F (36.8 °C) (Oral)     Lab Results   Component Value Date    WBC 6.58 09/14/2023     No results for input(s): "LACTATE" in the last 72 hours.    Persistent skin " infection, with edema, erythema, despite keflex use  Xray of right foot/ankle negative   ID consulted and following: notes reviewed  Podiatry consulted for possible surgical debridement  MRI pending    Antibiotics given-   Antibiotics (From admission, onward)    Start     Stop Route Frequency Ordered    09/13/23 0900  vancomycin (VANCOCIN) 1,000 mg in dextrose 5 % (D5W) 250 mL IVPB (Vial-Mate)         -- IV Every 12 hours (non-standard times) 09/13/23 0114    09/13/23 0400  piperacillin-tazobactam (ZOSYN) 4.5 g in dextrose 5 % in water (D5W) 100 mL IVPB (MB+)         -- IV Every 8 hours (non-standard times) 09/13/23 0104    09/13/23 0156  vancomycin - pharmacy to dose  (vancomycin IVPB (PEDS and ADULTS))        See Hyperspace for full Linked Orders Report.    -- IV pharmacy to manage frequency 09/13/23 0100        Cultures were taken-   Microbiology Results (last 7 days)     ** No results found for the last 168 hours. **          Elevated alkaline phosphatase level  C/w monitoring      Hyponatremia  Patient has hyponatremia which is controlled,We will aim to correct the sodium by 4-6mEq in 24 hours. We will monitor sodium Daily. The hyponatremia is due to Dehydration/hypovolemia. We will obtain the following studies: none. We will treat the hyponatremia with IV fluids as follows: s/p fluids in ED. Encourage oral intake. The patient's sodium results have been reviewed and are listed below.  Recent Labs   Lab 09/14/23  0438   *       Normocytic anemia  Lab Results   Component Value Date    HGB 12.1 (L) 09/14/2023    HCT 33.1 (L) 09/14/2023     Cont to monitor  Transfuse if patient becomes hemodynamically unstable, symptomatic or H/H drops below 7/21.     Type 2 diabetes mellitus with circulatory disorder, without long-term current use of insulin  Lab Results   Component Value Date    HGBA1C 12.9 (H) 09/13/2023     Recent Labs   Lab 09/14/23  0602   POCTGLUCOSE 264*        Hold home oral agents- insulin is the  preferred treatment for hyperglycemia  Cont current basal/prandial insulin regimen   Low dose correction scale   Cont blood glucose monitoring   BG goal:  Preprandial blood glucose target <140 mg/dL  Random glucoses <180 mg/dL  Avoid hypoglycemia -  Reduce antihyperglycemic therapy when caloric intake is reduced. Avoid insulin stacking as a result of repeated injection of prandial insulin at close intervals.   Avoid severe hyperglycemia  ADA diet  Antihyperglycemics (From admission, onward)    Start     Stop Route Frequency Ordered    09/14/23 0715  insulin aspart U-100 pen 6 Units         -- SubQ 3 times daily with meals 09/13/23 1746 09/13/23 2100  insulin detemir U-100 (Levemir) pen 14 Units         -- SubQ Nightly 09/13/23 1746 09/13/23 0159  insulin aspart U-100 pen 0-5 Units         -- SubQ Before meals & nightly PRN 09/13/23 0100             VTE Risk Mitigation (From admission, onward)         Ordered     IP VTE LOW RISK PATIENT  Once         09/13/23 1530     Place sequential compression device  Until discontinued         09/13/23 0055                      I have completed this tele-visit without the assistance of a telepresenter.    The attending portion of this evaluation, treatment, and documentation was performed per Amira Cooper MD via Telemedicine AudioVisual using the secure [employer] software platform with 2 way audio/video. The provider was located off-site and the patient is located in the hospital. The aforementioned video software was utilized to document the relevant history and physical exam    Amira Cooper MD  Department of Hospital Medicine   [facility]

## 2023-09-14 NOTE — CONSULTS
Thank you for your consult to [facility]. We have reviewed the patient chart. This patient does meet criteria for [facility] service at this time.  Will assume care on 09/14/23 at 7AM.

## 2023-09-14 NOTE — PLAN OF CARE
"   09/14/23 1037   Post-Acute Status   Post-Acute Authorization Other   Other Status Awaiting f/u Appts   Discharge Plan   Discharge Plan A Home;Home with family     Pending medical clearance. Referrals for [facility] Endo/PrimaryCare/Wound care faxed to clinics.  alerted. Also requested appt with local community clinic to establish care.    No future appointments.    BP (!) 158/105   Pulse 87   Temp 98.1 °F (36.7 °C)   Resp 20   Ht 5' 5" (1.651 m)   Wt 79.2 kg (174 lb 9.7 oz)   SpO2 99%   BMI 29.06 kg/m²      atorvastatin  40 mg Oral Daily    insulin aspart U-100  6 Units Subcutaneous TIDWM    insulin detemir U-100  14 Units Subcutaneous QHS    lisinopriL  10 mg Oral Daily    piperacillin-tazobactam (Zosyn) IV (PEDS and ADULTS) (extended infusion is not appropriate)  4.5 g Intravenous Q8H    vancomycin (VANCOCIN) IV (PEDS and ADULTS)  1,750 mg Intravenous Q12H     Consults (From admission, onward)          Status Ordering Provider     Inpatient consult to Diabetes educator  Once        Provider:  (Not yet assigned)    Acknowledged MARISOL GUAJARDO     Inpatient virtual consult to Hospital Medicine  Once        Provider:  (Not yet assigned)    Completed MARISOL GUAJARDO     Inpatient consult to Podiatry  Once        Provider:  (Not yet assigned)    Acknowledged ALIZE GARCIA     Inpatient consult to Infectious Diseases  Once        Provider:  (Not yet assigned)    Completed MARISOL GUAJARDO     Inpatient consult to Podiatry  Once        Provider:  (Not yet assigned)    Acknowledged MARISOL GUAJARDO     Inpatient consult to Registered Dietitian/Nutritionist  Once        Provider:  (Not yet assigned)    Completed ALIZE GARCIA     Pharmacy to dose Vancomycin consult  Once        Provider:  (Not yet assigned)   See Dolores for full Linked Orders Report.    Acknowledged ALIZE GARCIA            "

## 2023-09-14 NOTE — PLAN OF CARE
Pt remains A+Ox4. MRI and BLE US completed today. IV abx to PIVs. Tolerating diet. Insulin administration education provided by this RN, DM education per Diabetes educator. No c/o pain to foot, ambulating freely. Per podiatry, pain wounds with betadine BID. Safety precautions maintained.     Problem: Adult Inpatient Plan of Care  Goal: Plan of Care Review  Outcome: Ongoing, Progressing  Goal: Absence of Hospital-Acquired Illness or Injury  Outcome: Ongoing, Progressing  Goal: Optimal Comfort and Wellbeing  Outcome: Ongoing, Progressing  Goal: Readiness for Transition of Care  Outcome: Ongoing, Progressing     Problem: Pain Chronic (Persistent) (Comorbidity Management)  Goal: Acceptable Pain Control and Functional Ability  Outcome: Ongoing, Progressing     Problem: Infection  Goal: Absence of Infection Signs and Symptoms  Outcome: Ongoing, Progressing     Problem: Diabetes Comorbidity  Goal: Blood Glucose Level Within Targeted Range  Outcome: Ongoing, Progressing

## 2023-09-14 NOTE — ASSESSMENT & PLAN NOTE
Patient has hyponatremia which is controlled,We will aim to correct the sodium by 4-6mEq in 24 hours. We will monitor sodium Daily. The hyponatremia is due to Dehydration/hypovolemia. We will obtain the following studies: none. We will treat the hyponatremia with IV fluids as follows: s/p fluids in ED. Encourage oral intake. The patient's sodium results have been reviewed and are listed below.  Recent Labs   Lab 09/14/23  0438   *

## 2023-09-15 LAB
ANION GAP SERPL CALC-SCNC: 9 MMOL/L (ref 8–16)
BASOPHILS # BLD AUTO: 0.04 K/UL (ref 0–0.2)
BASOPHILS NFR BLD: 0.5 % (ref 0–1.9)
BUN SERPL-MCNC: 20 MG/DL (ref 6–20)
CALCIUM SERPL-MCNC: 8.9 MG/DL (ref 8.7–10.5)
CHLORIDE SERPL-SCNC: 101 MMOL/L (ref 95–110)
CO2 SERPL-SCNC: 25 MMOL/L (ref 23–29)
CREAT SERPL-MCNC: 1 MG/DL (ref 0.5–1.4)
DIFFERENTIAL METHOD: ABNORMAL
EOSINOPHIL # BLD AUTO: 0.2 K/UL (ref 0–0.5)
EOSINOPHIL NFR BLD: 2.4 % (ref 0–8)
ERYTHROCYTE [DISTWIDTH] IN BLOOD BY AUTOMATED COUNT: 11.3 % (ref 11.5–14.5)
EST. GFR  (NO RACE VARIABLE): >60 ML/MIN/1.73 M^2
GLUCOSE SERPL-MCNC: 317 MG/DL (ref 70–110)
HCT VFR BLD AUTO: 33.8 % (ref 40–54)
HGB BLD-MCNC: 12.2 G/DL (ref 14–18)
IMM GRANULOCYTES # BLD AUTO: 0.02 K/UL (ref 0–0.04)
IMM GRANULOCYTES NFR BLD AUTO: 0.2 % (ref 0–0.5)
LYMPHOCYTES # BLD AUTO: 2.4 K/UL (ref 1–4.8)
LYMPHOCYTES NFR BLD: 29.5 % (ref 18–48)
MCH RBC QN AUTO: 29.3 PG (ref 27–31)
MCHC RBC AUTO-ENTMCNC: 36.1 G/DL (ref 32–36)
MCV RBC AUTO: 81 FL (ref 82–98)
MONOCYTES # BLD AUTO: 0.7 K/UL (ref 0.3–1)
MONOCYTES NFR BLD: 8.7 % (ref 4–15)
NEUTROPHILS # BLD AUTO: 4.7 K/UL (ref 1.8–7.7)
NEUTROPHILS NFR BLD: 58.7 % (ref 38–73)
NRBC BLD-RTO: 0 /100 WBC
PLATELET # BLD AUTO: 273 K/UL (ref 150–450)
PMV BLD AUTO: 10.1 FL (ref 9.2–12.9)
POCT GLUCOSE: 223 MG/DL (ref 70–110)
POCT GLUCOSE: 269 MG/DL (ref 70–110)
POCT GLUCOSE: 276 MG/DL (ref 70–110)
POCT GLUCOSE: 281 MG/DL (ref 70–110)
POTASSIUM SERPL-SCNC: 4.2 MMOL/L (ref 3.5–5.1)
RBC # BLD AUTO: 4.17 M/UL (ref 4.6–6.2)
SODIUM SERPL-SCNC: 135 MMOL/L (ref 136–145)
VANCOMYCIN TROUGH SERPL-MCNC: 7.9 UG/ML (ref 10–22)
WBC # BLD AUTO: 8.01 K/UL (ref 3.9–12.7)

## 2023-09-15 PROCEDURE — 11042 DEBRIDEMENT: ICD-10-PCS | Mod: ,,, | Performed by: STUDENT IN AN ORGANIZED HEALTH CARE EDUCATION/TRAINING PROGRAM

## 2023-09-15 PROCEDURE — 25000003 PHARM REV CODE 250: Performed by: STUDENT IN AN ORGANIZED HEALTH CARE EDUCATION/TRAINING PROGRAM

## 2023-09-15 PROCEDURE — 87070 CULTURE OTHR SPECIMN AEROBIC: CPT | Performed by: STUDENT IN AN ORGANIZED HEALTH CARE EDUCATION/TRAINING PROGRAM

## 2023-09-15 PROCEDURE — 25000003 PHARM REV CODE 250: Performed by: HOSPITALIST

## 2023-09-15 PROCEDURE — 85025 COMPLETE CBC W/AUTO DIFF WBC: CPT | Performed by: STUDENT IN AN ORGANIZED HEALTH CARE EDUCATION/TRAINING PROGRAM

## 2023-09-15 PROCEDURE — 99233 PR SUBSEQUENT HOSPITAL CARE,LEVL III: ICD-10-PCS | Mod: 25,,, | Performed by: STUDENT IN AN ORGANIZED HEALTH CARE EDUCATION/TRAINING PROGRAM

## 2023-09-15 PROCEDURE — 11000001 HC ACUTE MED/SURG PRIVATE ROOM

## 2023-09-15 PROCEDURE — 87075 CULTR BACTERIA EXCEPT BLOOD: CPT | Performed by: STUDENT IN AN ORGANIZED HEALTH CARE EDUCATION/TRAINING PROGRAM

## 2023-09-15 PROCEDURE — 63600175 PHARM REV CODE 636 W HCPCS: Performed by: HOSPITALIST

## 2023-09-15 PROCEDURE — 99233 SBSQ HOSP IP/OBS HIGH 50: CPT | Mod: 25,,, | Performed by: STUDENT IN AN ORGANIZED HEALTH CARE EDUCATION/TRAINING PROGRAM

## 2023-09-15 PROCEDURE — 80202 ASSAY OF VANCOMYCIN: CPT | Performed by: HOSPITALIST

## 2023-09-15 PROCEDURE — 99900035 HC TECH TIME PER 15 MIN (STAT)

## 2023-09-15 PROCEDURE — 94761 N-INVAS EAR/PLS OXIMETRY MLT: CPT

## 2023-09-15 PROCEDURE — 80048 BASIC METABOLIC PNL TOTAL CA: CPT | Performed by: STUDENT IN AN ORGANIZED HEALTH CARE EDUCATION/TRAINING PROGRAM

## 2023-09-15 PROCEDURE — 36415 COLL VENOUS BLD VENIPUNCTURE: CPT | Performed by: HOSPITALIST

## 2023-09-15 PROCEDURE — 36415 COLL VENOUS BLD VENIPUNCTURE: CPT | Performed by: STUDENT IN AN ORGANIZED HEALTH CARE EDUCATION/TRAINING PROGRAM

## 2023-09-15 PROCEDURE — 11042 DBRDMT SUBQ TIS 1ST 20SQCM/<: CPT | Mod: ,,, | Performed by: STUDENT IN AN ORGANIZED HEALTH CARE EDUCATION/TRAINING PROGRAM

## 2023-09-15 RX ORDER — INSULIN ASPART 100 [IU]/ML
9 INJECTION, SOLUTION INTRAVENOUS; SUBCUTANEOUS
Status: DISCONTINUED | OUTPATIENT
Start: 2023-09-15 | End: 2023-09-17 | Stop reason: HOSPADM

## 2023-09-15 RX ADMIN — HYDROCODONE BITARTRATE AND ACETAMINOPHEN 1 TABLET: 5; 325 TABLET ORAL at 04:09

## 2023-09-15 RX ADMIN — INSULIN ASPART 9 UNITS: 100 INJECTION, SOLUTION INTRAVENOUS; SUBCUTANEOUS at 12:09

## 2023-09-15 RX ADMIN — PIPERACILLIN AND TAZOBACTAM 4.5 G: 4; .5 INJECTION, POWDER, LYOPHILIZED, FOR SOLUTION INTRAVENOUS; PARENTERAL at 05:09

## 2023-09-15 RX ADMIN — VANCOMYCIN HYDROCHLORIDE 1250 MG: 1.25 INJECTION, POWDER, LYOPHILIZED, FOR SOLUTION INTRAVENOUS at 11:09

## 2023-09-15 RX ADMIN — HYDROCODONE BITARTRATE AND ACETAMINOPHEN 1 TABLET: 5; 325 TABLET ORAL at 05:09

## 2023-09-15 RX ADMIN — INSULIN ASPART 2 UNITS: 100 INJECTION, SOLUTION INTRAVENOUS; SUBCUTANEOUS at 05:09

## 2023-09-15 RX ADMIN — INSULIN ASPART 3 UNITS: 100 INJECTION, SOLUTION INTRAVENOUS; SUBCUTANEOUS at 05:09

## 2023-09-15 RX ADMIN — INSULIN ASPART 1 UNITS: 100 INJECTION, SOLUTION INTRAVENOUS; SUBCUTANEOUS at 10:09

## 2023-09-15 RX ADMIN — LISINOPRIL 10 MG: 10 TABLET ORAL at 08:09

## 2023-09-15 RX ADMIN — ATORVASTATIN CALCIUM 40 MG: 40 TABLET, FILM COATED ORAL at 08:09

## 2023-09-15 RX ADMIN — INSULIN ASPART 9 UNITS: 100 INJECTION, SOLUTION INTRAVENOUS; SUBCUTANEOUS at 05:09

## 2023-09-15 RX ADMIN — PIPERACILLIN AND TAZOBACTAM 4.5 G: 4; .5 INJECTION, POWDER, LYOPHILIZED, FOR SOLUTION INTRAVENOUS; PARENTERAL at 04:09

## 2023-09-15 RX ADMIN — VANCOMYCIN HYDROCHLORIDE 1250 MG: 1.25 INJECTION, POWDER, LYOPHILIZED, FOR SOLUTION INTRAVENOUS at 09:09

## 2023-09-15 RX ADMIN — INSULIN ASPART 3 UNITS: 100 INJECTION, SOLUTION INTRAVENOUS; SUBCUTANEOUS at 12:09

## 2023-09-15 NOTE — SUBJECTIVE & OBJECTIVE
Interval History: seen and examined at bedside. Stated that erythema and swelling improved. MRI of foot concerning for abscess. Podiatry and ID following. Will discuss with podiatry.     Review of Systems   Constitutional:  Negative for chills, fatigue and fever.   HENT: Negative.     Eyes: Negative.    Respiratory:  Negative for cough and shortness of breath.    Cardiovascular:  Negative for chest pain, palpitations and leg swelling.   Gastrointestinal:  Negative for abdominal pain and vomiting.   Genitourinary: Negative.    Skin: Negative.    Neurological:  Negative for seizures and speech difficulty.   Psychiatric/Behavioral:  Negative for agitation and confusion. The patient is not nervous/anxious.      Objective:     Vital Signs (Most Recent):  Temp: 98.5 °F (36.9 °C) (09/15/23 0800)  Pulse: 94 (09/15/23 0800)  Resp: 20 (09/15/23 0800)  BP: 131/83 (09/15/23 0800)  SpO2: 98 % (09/15/23 0800) Vital Signs (24h Range):  Temp:  [97.5 °F (36.4 °C)-98.5 °F (36.9 °C)] 98.5 °F (36.9 °C)  Pulse:  [] 94  Resp:  [14-20] 20  SpO2:  [97 %-99 %] 98 %  BP: (126-139)/(79-94) 131/83     Weight: 80.4 kg (177 lb 4 oz)  Body mass index is 29.5 kg/m².    Intake/Output Summary (Last 24 hours) at 9/15/2023 0826  Last data filed at 9/15/2023 0000  Gross per 24 hour   Intake 1649.04 ml   Output 203 ml   Net 1446.04 ml           Physical Exam  Vitals and nursing note reviewed.   Constitutional:       General: He is awake. He is not in acute distress.     Appearance: Normal appearance. He is well-developed and well-groomed. He is not ill-appearing, toxic-appearing or diaphoretic.   HENT:      Head: Normocephalic and atraumatic.   Cardiovascular:      Rate and Rhythm: Normal rate.   Pulmonary:      Effort: Pulmonary effort is normal. No tachypnea, accessory muscle usage or respiratory distress.   Neurological:      Mental Status: He is alert and oriented to person, place, and time. Mental status is at baseline.   Psychiatric:          Attention and Perception: Attention normal. He is attentive.         Mood and Affect: Mood normal. Mood is not anxious.         Speech: Speech normal.         Behavior: Behavior normal. Behavior is cooperative.         Thought Content: Thought content normal.         Cognition and Memory: Cognition and memory normal. Cognition is not impaired. Memory is not impaired. He does not exhibit impaired recent memory.         Judgment: Judgment normal.             Significant Labs: All pertinent labs within the past 24 hours have been reviewed.    Significant Imaging: I have reviewed all pertinent imaging results/findings within the past 24 hours.

## 2023-09-15 NOTE — PROGRESS NOTES
Pharmacokinetic Assessment Follow Up: IV Vancomycin    Vancomycin serum concentration assessment(s):    The trough level was drawn correctly and can be used to guide therapy at this time. The measurement is below the desired definitive target range of 10 to 15 mcg/mL.    Vancomycin Regimen Plan:    Change regimen to Vancomycin 1250 mg IV every 8 hours with next serum trough concentration measured at 0900 prior to 4th dose on 9/16    Drug levels (last 3 results):  Recent Labs   Lab Result Units 09/14/23  0813 09/15/23  0829   Vancomycin-Trough ug/mL 5.1* 7.9*       Pharmacy will continue to follow and monitor vancomycin.    Please contact pharmacy at extension [phone] for questions regarding this assessment.    Thank you for the consult,   Carmita Ryan       Patient brief summary:  Juanpablo Gomez is a 38 y.o. male initiated on antimicrobial therapy with IV Vancomycin for treatment of skin & soft tissue infection    The patient's current regimen is vancomycin 1750 mg IV q 12 hours    Drug Allergies:   Review of patient's allergies indicates:  No Known Allergies    Actual Body Weight:   80.4 kg    Renal Function:   Estimated Creatinine Clearance: 98.9 mL/min (based on SCr of 1 mg/dL).,     Dialysis Method (if applicable):  N/A    CBC (last 72 hours):  Recent Labs   Lab Result Units 09/12/23  2013 09/13/23  0448 09/14/23  0438 09/15/23  0429   WBC K/uL 9.78 7.97 6.58 8.01   Hemoglobin g/dL 12.3* 11.7* 12.1* 12.2*   Hemoglobin A1C %  --  12.9*  --   --    Hematocrit % 35.2* 32.8* 33.1* 33.8*   Platelets K/uL 298 254 263 273   Gran % % 56.8 50.4 55.1 58.7   Lymph % % 33.9 37.3 33.4 29.5   Mono % % 7.2 9.2 8.5 8.7   Eosinophil % % 1.6 2.4 2.3 2.4   Basophil % % 0.3 0.4 0.5 0.5   Differential Method  Automated Automated Automated Automated       Metabolic Panel (last 72 hours):  Recent Labs   Lab Result Units 09/12/23 2013 09/13/23 0448 09/13/23  1710 09/14/23  0438 09/15/23  0429   Sodium mmol/L 127* 134*  --  135*  135*   Potassium mmol/L 4.3 3.8  --  3.7 4.2   Chloride mmol/L 93* 102  --  102 101   CO2 mmol/L 21* 24  --  24 25   Glucose mg/dL 418* 399*  --  304* 317*   Glucose, UA   --   --  4+*  --   --    BUN mg/dL 31* 24*  --  15 20   Creatinine mg/dL 1.24 1.0  --  0.8 1.0   Albumin g/dL 3.8  --   --   --   --    Total Bilirubin mg/dL 0.4  --   --   --   --    Alkaline Phosphatase U/L 146*  --   --   --   --    AST U/L 18  --   --   --   --    ALT U/L 24  --   --   --   --        Vancomycin Administrations:  vancomycin given in the last 96 hours                     vancomycin (VANCOCIN) 1,750 mg in dextrose 5 % (D5W) 500 mL IVPB (mg) 1,750 mg New Bag 09/14/23 2058      Restarted  1246     1,750 mg New Bag  0953    vancomycin (VANCOCIN) 1,000 mg in dextrose 5 % (D5W) 250 mL IVPB (Vial-Mate) (mg) 1,000 mg New Bag 09/13/23 2202     1,000 mg New Bag  0856    vancomycin (VANCOCIN) 2,000 mg in sodium chloride 0.9% 500 mL IVPB (mg) 2,000 mg New Bag 09/12/23 2044                    Microbiologic Results:  Microbiology Results (last 7 days)       ** No results found for the last 168 hours. **

## 2023-09-15 NOTE — ASSESSMENT & PLAN NOTE
MRI reviewed with possible fluid collection to dorsal foot ulcer  Debridement performed with cultures taken, mild serosanguinous bloody drainage expressed, no purulence noted.   Cellulitis appears to be resolving on antibiotics, continue antibiotic management per Infectious Disease recommendations  Rest, elevate  WBAT  Nursing orders in for dressing changes, upon d/c, home health to change same 2-3x per week   He is to follow up in Podiatry clinic within 2-4 weeks upon d/c  Will follow

## 2023-09-15 NOTE — ASSESSMENT & PLAN NOTE
Patient has hyponatremia which is controlled,We will aim to correct the sodium by 4-6mEq in 24 hours. We will monitor sodium Daily. The hyponatremia is due to Dehydration/hypovolemia. We will obtain the following studies: none. We will treat the hyponatremia with IV fluids as follows: s/p fluids in ED. Encourage oral intake. The patient's sodium results have been reviewed and are listed below.  Recent Labs   Lab 09/15/23  0429   *

## 2023-09-15 NOTE — PROGRESS NOTES
General Infectious Diseases: Follow up Note    Impression/Plan:  37 yo M with pmh of diabetes presenting with a R foot wound that resulted after an injury from his shoes. He presented initially on 9/4 for similar issues and was treated in the ER with zosyn and was discharged on a course of Keflex. He is returning after wound on the dorsum of his foot started draining. Currently on vanc and zosyn. MRI not concerning for osteo.     - discontinue vanc/zosyn  -start doxycycline and augmentin  - question of whether podiatry will drain abscess in foot    ID will follow the patient with you.     Rand Bradshaw MD  [employer] IM/Peds PGY-1  Infectious Disease    Subjective and Interval History:  Patient is feeling well; says swelling and pain have decreased significantly. MRI not consistent with osteo.    Objective:    Medications:  Antibiotics:   Antibiotics (From admission, onward)      Start     Stop Route Frequency Ordered    09/15/23 1000  vancomycin 1,250 mg in dextrose 5 % (D5W) 250 mL IVPB (Vial-Mate)         -- IV Every 8 hours (non-standard times) 09/15/23 0917    09/13/23 0400  piperacillin-tazobactam (ZOSYN) 4.5 g in dextrose 5 % in water (D5W) 100 mL IVPB (MB+)         -- IV Every 8 hours (non-standard times) 09/13/23 0104    09/13/23 0156  vancomycin - pharmacy to dose  (vancomycin IVPB (PEDS and ADULTS))        See Dolores for full Linked Orders Report.    -- IV pharmacy to manage frequency 09/13/23 0100            Physical Exam:  VS (24h):   Vitals:    09/15/23 0800   BP: 131/83   Pulse: 94   Resp: 20   Temp: 98.5 °F (36.9 °C)     Temp:  [97.5 °F (36.4 °C)-98.5 °F (36.9 °C)]       Gen: Nontoxic, comfortable, no acute distress.    HEENT: PERRL. No scleral icterus. EOMI intact. No sinus tenderness. OP clear.  Pulmonary: Non labored. Clear to auscultation A/P/L. No wheezing, crackles, or rhonchi.   Cardiac: Normal S1 & S2 w/o rubs/murmurs/gallops.   Abdomen: Normal bowel sounds. Soft, nontender, nondistended. No  rebound or guarding. No hepatosplenomegaly.  Extremities: TTP to dorsum of R foot. Bruising noted on 2nd toe. Minimal erythema and swelling. Closed wound with surrounding bruising. No drainage.  Lymphatics: no preauricular, cervical, supraclavicular, or axillary LAD.  Musculoskeletal: no joint deformities or effusions.  Neurological:  Alert and oriented.  Skin: See MSK    Labs:  CBC:   Lab Results   Component Value Date    WBC 8.01 09/15/2023    WBC 6.58 09/14/2023    WBC 7.97 09/13/2023    WBC 9.78 09/12/2023    WBC 8.80 09/04/2023    HCT 33.8 (L) 09/15/2023     09/15/2023       BMP:   Recent Labs   Lab 09/15/23  0429   *   *   K 4.2      CO2 25   BUN 20   CREATININE 1.0   CALCIUM 8.9       LFT:   Lab Results   Component Value Date    ALT 24 09/12/2023    AST 18 09/12/2023    ALKPHOS 146 (H) 09/12/2023    BILITOT 0.4 09/12/2023         Microbiology:  None    Imaging:  MRI  1. Dorsal midfoot skin ulceration with adjacent small subcutaneous focus of fluid concerning for possible abscess.  Diffuse dorsal soft tissue edema suggestive for infection/cellulitis.  Evaluation is limited secondary to lack of intravenous contrast.  2. Mild increased signal within the proximal 2nd metatarsal favored to represent reactive marrow edema.  No convincing evidence of osteomyelitis.

## 2023-09-15 NOTE — ASSESSMENT & PLAN NOTE
"Temp Readings from Last 3 Encounters:   09/15/23 98.5 °F (36.9 °C)   09/04/23 98.2 °F (36.8 °C) (Oral)     Lab Results   Component Value Date    WBC 8.01 09/15/2023     No results for input(s): "LACTATE" in the last 72 hours.    Persistent skin infection, with edema, erythema, despite keflex use  Xray of right foot/ankle negative   Arterial sonogram LE: No hemodynamically significant stenosis demonstrated in the right or left lower extremity arterial system.   MRI: Dorsal midfoot skin ulceration with adjacent small subcutaneous focus of fluid concerning for possible abscess.  Diffuse dorsal soft tissue edema suggestive for infection/cellulitis  ID consulted and following: notes reviewed  Podiatry consulted and following: notes reviewed.   Will discuss with podiatry about need for surgery.   Antibiotics given-   Antibiotics (From admission, onward)    Start     Stop Route Frequency Ordered    09/14/23 0900  vancomycin (VANCOCIN) 1,750 mg in dextrose 5 % (D5W) 500 mL IVPB         -- IV Every 12 hours (non-standard times) 09/14/23 0857    09/13/23 0400  piperacillin-tazobactam (ZOSYN) 4.5 g in dextrose 5 % in water (D5W) 100 mL IVPB (MB+)         -- IV Every 8 hours (non-standard times) 09/13/23 0104    09/13/23 0156  vancomycin - pharmacy to dose  (vancomycin IVPB (PEDS and ADULTS))        See Hyperspace for full Linked Orders Report.    -- IV pharmacy to manage frequency 09/13/23 0100        Cultures were taken-   Microbiology Results (last 7 days)     ** No results found for the last 168 hours. **        "

## 2023-09-15 NOTE — ASSESSMENT & PLAN NOTE
Lab Results   Component Value Date    HGB 12.2 (L) 09/15/2023    HCT 33.8 (L) 09/15/2023     Cont to monitor  Transfuse if patient becomes hemodynamically unstable, symptomatic or H/H drops below 7/21.

## 2023-09-15 NOTE — PROGRESS NOTES
[place] [facility] Surg  Podiatry  Progress Note    Patient Name: Juanpablo Gomez  MRN: [id number]  Admission Date: 9/12/2023  Hospital Length of Stay: 3 days  Attending Physician: Amira Sutton MD  Primary Care Provider: Radha, Primary Doctor     Subjective:     Interval History: Seen at bedside, denies pain        Scheduled Meds:   atorvastatin  40 mg Oral Daily    insulin aspart U-100  9 Units Subcutaneous TIDWM    insulin detemir U-100  10 Units Subcutaneous BID    lisinopriL  10 mg Oral Daily    piperacillin-tazobactam (Zosyn) IV (PEDS and ADULTS) (extended infusion is not appropriate)  4.5 g Intravenous Q8H    vancomycin (VANCOCIN) IV (PEDS and ADULTS)  1,250 mg Intravenous Q8H     Continuous Infusions:  PRN Meds:sodium chloride 0.9%, acetaminophen, dextrose 10%, dextrose 10%, glucagon (human recombinant), glucose, glucose, HYDROcodone-acetaminophen, insulin aspart U-100, melatonin, melatonin, naloxone, ondansetron, simethicone, sodium chloride 0.9%, sodium chloride 0.9%, Pharmacy to dose Vancomycin consult **AND** vancomycin - pharmacy to dose    Review of Systems   Constitutional:  Negative for activity change, chills, diaphoresis and fever.   HENT:  Negative for congestion and hearing loss.    Respiratory:  Negative for cough and shortness of breath.    Cardiovascular:  Negative for leg swelling.   Gastrointestinal:  Negative for nausea and vomiting.   Musculoskeletal:  Positive for gait problem. Negative for back pain, joint swelling and myalgias.   Skin:  Positive for color change and wound. Negative for pallor and rash.   Neurological:  Positive for numbness. Negative for tremors, speech difficulty and weakness.   Psychiatric/Behavioral:  Negative for agitation and confusion. The patient is not nervous/anxious.      Objective:     Vital Signs (Most Recent):  Temp: 98.2 °F (36.8 °C) (09/15/23 1203)  Pulse: 93 (09/15/23 1203)  Resp: 20 (09/15/23 1203)  BP: (!) 150/101 (09/15/23 1203)  SpO2: 99 % (09/15/23  "1203) Vital Signs (24h Range):  Temp:  [98.1 °F (36.7 °C)-98.5 °F (36.9 °C)] 98.2 °F (36.8 °C)  Pulse:  [] 93  Resp:  [14-20] 20  SpO2:  [97 %-99 %] 99 %  BP: (126-150)/() 150/101     Weight: 80.4 kg (177 lb 4 oz)  Body mass index is 29.5 kg/m².    Foot Exam    General  General Appearance: appears stated age and healthy   Orientation: alert and oriented to person, place, and time   Affect: appropriate       Right Foot/Ankle     Inspection and Palpation  Ecchymosis: none  Tenderness: none   Swelling: none   Skin Exam: skin intact;     Neurovascular  Dorsalis pedis: 1+  Posterior tibial: 1+  Saphenous nerve sensation: diminished  Tibial nerve sensation: diminished  Superficial peroneal nerve sensation: diminished  Deep peroneal nerve sensation: diminished  Sural nerve sensation: diminished      Left Foot/Ankle      Inspection and Palpation  Ecchymosis: none  Tenderness: none   Swelling: none   Skin Exam: cellulitis and ulcer;     Neurovascular  Dorsalis pedis: 1+  Posterior tibial: 1+  Saphenous nerve sensation: diminished  Tibial nerve sensation: diminished  Superficial peroneal nerve sensation: diminished  Deep peroneal nerve sensation: diminished  Sural nerve sensation: diminished          Laboratory:  A1C:   Recent Labs   Lab 09/13/23 0448   HGBA1C 12.9*     CBC:   Recent Labs   Lab 09/15/23  0429   WBC 8.01   RBC 4.17*   HGB 12.2*   HCT 33.8*      MCV 81*   MCH 29.3   MCHC 36.1*     CMP:   Recent Labs   Lab 09/12/23  2013 09/13/23  0448 09/15/23  0429   *   < > 317*   CALCIUM 9.1   < > 8.9   ALBUMIN 3.8  --   --    PROT 7.6  --   --    *   < > 135*   K 4.3   < > 4.2   CO2 21*   < > 25   CL 93*   < > 101   BUN 31*   < > 20   CREATININE 1.24   < > 1.0   ALKPHOS 146*  --   --    ALT 24  --   --    AST 18  --   --    BILITOT 0.4  --   --     < > = values in this interval not displayed.     CRP:   Recent Labs   Lab 09/12/23 2013   CRP 1.40*     ESR: No results for input(s): "SEDRATE" " "in the last 168 hours.  Wound Cultures: No results for input(s): "LABAERO" in the last 4320 hours.    Diagnostic Results:  MRI: I have reviewed all pertinent results/findings within the past 24 hours.  reviewed  X-Ray: I have reviewed all pertinent results/findings within the past 24 hours.  reviewed    Clinical Findings:  Eschar to dorsal right foot, upon debridement, to level of subcutaneous layer, no purulence. No clinical signs of abscess noted. No fluctuance or crepitus. Cellulitis appears to be resolving        Assessment/Plan:     ID  * Cellulitis  MRI reviewed with possible fluid collection to dorsal foot ulcer  Debridement performed with cultures taken, mild serosanguinous bloody drainage expressed, no purulence noted.   Cellulitis appears to be resolving on antibiotics, continue antibiotic management per Infectious Disease recommendations  Rest, elevate  WBAT  Nursing orders in for dressing changes, upon d/c, home health to change same 2-3x per week   He is to follow up in Podiatry clinic within 2-4 weeks upon d/c  Will follow        Nicolasa Michaels DPM  Podiatry  [facility] Surg  "

## 2023-09-15 NOTE — PLAN OF CARE
Pt is AAOX4. Scheduled meds were given per MAR. No C/O pain or N/V. Family is present at the bedside. Diabetic diet maintained. Blood glucose monitored. Pt on RA sats at 98%. Bed in lowest position, bed alarm is set. Call light within reach.   Problem: Pain Chronic (Persistent) (Comorbidity Management)  Goal: Acceptable Pain Control and Functional Ability  Outcome: Ongoing, Progressing     Problem: Pain Acute  Goal: Acceptable Pain Control and Functional Ability  Outcome: Ongoing, Progressing     Problem: Adult Inpatient Plan of Care  Goal: Plan of Care Review  Outcome: Ongoing, Progressing

## 2023-09-15 NOTE — ASSESSMENT & PLAN NOTE
Lab Results   Component Value Date    HGBA1C 12.9 (H) 09/13/2023     Recent Labs   Lab 09/15/23  0522   POCTGLUCOSE 276*        Hold home oral agents- insulin is the preferred treatment for hyperglycemia  Cont current basal/prandial insulin regimen   Low dose correction scale   Cont blood glucose monitoring   BG goal:  Preprandial blood glucose target <140 mg/dL  Random glucoses <180 mg/dL  Avoid hypoglycemia -  Reduce antihyperglycemic therapy when caloric intake is reduced. Avoid insulin stacking as a result of repeated injection of prandial insulin at close intervals.   Avoid severe hyperglycemia  ADA diet  Antihyperglycemics (From admission, onward)    Start     Stop Route Frequency Ordered    09/14/23 0715  insulin aspart U-100 pen 6 Units         -- SubQ 3 times daily with meals 09/13/23 1746 09/13/23 2100  insulin detemir U-100 (Levemir) pen 14 Units         -- SubQ Nightly 09/13/23 1746    09/13/23 0159  insulin aspart U-100 pen 0-5 Units         -- SubQ Before meals & nightly PRN 09/13/23 0100

## 2023-09-15 NOTE — PROCEDURES
"Juanpablo Gomez is a 38 y.o. male patient.    Temp: 98.2 °F (36.8 °C) (09/15/23 1203)  Pulse: 93 (09/15/23 1203)  Resp: 20 (09/15/23 1203)  BP: (!) 150/101 (09/15/23 1203)  SpO2: 99 % (09/15/23 1203)  Weight: 80.4 kg (177 lb 4 oz) (09/14/23 2045)  Height: 5' 5" (165.1 cm) (09/12/23 1905)       Debridement    Date/Time: 9/15/2023 1:52 PM    Performed by: Nicolasa Michaels DPM  Authorized by: Nicolasa Michaels DPM    Consent Done?:  Yes (Verbal)  Local anesthesia used?: No      Wound Details:    Location:  Right foot    Location:  Right Dorsal Foot    Type of Debridement:  Excisional       Length (cm):  0.8       Area (sq cm):  0.72       Width (cm):  0.9       Percent Debrided (%):  100       Depth (cm):  0.2       Total Area Debrided (sq cm):  0.72    Depth of debridement:  Subcutaneous tissue    Tissue debrided:  Subcutaneous and Other    Devitalized tissue debrided:  Biofilm, Callus and Fibrin    Instruments:  Blade and Curette  Patient tolerance:  Patient tolerated the procedure well with no immediate complications      cultures were taken during this visit       9/15/2023    "

## 2023-09-15 NOTE — PLAN OF CARE
09/15/23 1547   Post-Acute Status   Post-Acute Authorization Other;IV Infusion   IV Infusion Status Referral(s) sent   Other Status See Comments        09/15/2023 0736 Fin Assist Follow Up Grisel England Patient    Financial Assistance Note   Still waiting on email with pt check stubs     Case Program Type Program Patients   [id number] Discounting [facility] Financial Assistance Juanpablo Damico (Self)         Follow-up Information       Endo/Diabetes/Coumadin, [facility] -. Schedule an appointment as soon as possible for a visit.    Specialties: Endocrinology, Nephrology  Why: As needed, FOLLOW UP WITH ENDOCRINOLOGIST  Contact information:  [address] [facility] [address]  [phone]               [facility] - Wound. Schedule an appointment as soon as possible for a visit.    Specialty: Wound Care  Why: As needed, FOLLOW UP WITH PODIATRY AFTER DISCHARGE, For wound re-check  Contact information:  [address] [facility] [address]  [phone]               [facility] -Primary. Go in 1 week(s).    Specialty: Internal Medicine  Why: DISCHARGE FOLLOW UP WITH COMMUNITY PCP  Contact information:  [address] [id number]  [phone]

## 2023-09-15 NOTE — PROGRESS NOTES
[facility] Medicine  Telemedicine Progress Note    Patient Name: Juanpablo Gomez  MRN: [id number]  Patient Class: IP- Inpatient   Admission Date: 9/12/2023  Length of Stay: 3 days  Attending Physician: Amira Sutton MD  Primary Care Provider: Radha, Primary Doctor          Subjective:     Principal Problem:Cellulitis        HPI:  Juanpablo Gonzalez is a 38 yr old male with PMH of diabetes, who presents with foot wound.    Pt states that over the past week, his foot has had worsening erythema, swelling, and pain. He was prescribed oral antibiotics, although the pain and swelling persisted, which prompted him to come to the ED for further evaluation.    In the ED, triage vitals were significant for elevated blood pressures, tachycardia. CBC was significant for normoctyic anemia. CMP was significant for hyponatremia and elevated sugars.    XR of ankle and foot was negative for gangrene or fracture.     Medicine was consult for cellulitis, that failed outpatient txt.       Overview/Hospital Course:  No notes on file    Interval History: seen and examined at bedside. Stated that erythema and swelling improved. MRI of foot concerning for abscess. Podiatry and ID following. Will discuss with podiatry.     Review of Systems   Constitutional:  Negative for chills, fatigue and fever.   HENT: Negative.     Eyes: Negative.    Respiratory:  Negative for cough and shortness of breath.    Cardiovascular:  Negative for chest pain, palpitations and leg swelling.   Gastrointestinal:  Negative for abdominal pain and vomiting.   Genitourinary: Negative.    Skin: Negative.    Neurological:  Negative for seizures and speech difficulty.   Psychiatric/Behavioral:  Negative for agitation and confusion. The patient is not nervous/anxious.      Objective:     Vital Signs (Most Recent):  Temp: 98.5 °F (36.9 °C) (09/15/23 0800)  Pulse: 94 (09/15/23 0800)  Resp: 20 (09/15/23 0800)  BP: 131/83 (09/15/23 0800)  SpO2: 98 % (09/15/23  "0800) Vital Signs (24h Range):  Temp:  [97.5 °F (36.4 °C)-98.5 °F (36.9 °C)] 98.5 °F (36.9 °C)  Pulse:  [] 94  Resp:  [14-20] 20  SpO2:  [97 %-99 %] 98 %  BP: (126-139)/(79-94) 131/83     Weight: 80.4 kg (177 lb 4 oz)  Body mass index is 29.5 kg/m².    Intake/Output Summary (Last 24 hours) at 9/15/2023 0826  Last data filed at 9/15/2023 0000  Gross per 24 hour   Intake 1649.04 ml   Output 203 ml   Net 1446.04 ml           Physical Exam  Vitals and nursing note reviewed.   Constitutional:       General: He is awake. He is not in acute distress.     Appearance: Normal appearance. He is well-developed and well-groomed. He is not ill-appearing, toxic-appearing or diaphoretic.   HENT:      Head: Normocephalic and atraumatic.   Cardiovascular:      Rate and Rhythm: Normal rate.   Pulmonary:      Effort: Pulmonary effort is normal. No tachypnea, accessory muscle usage or respiratory distress.   Neurological:      Mental Status: He is alert and oriented to person, place, and time. Mental status is at baseline.   Psychiatric:         Attention and Perception: Attention normal. He is attentive.         Mood and Affect: Mood normal. Mood is not anxious.         Speech: Speech normal.         Behavior: Behavior normal. Behavior is cooperative.         Thought Content: Thought content normal.         Cognition and Memory: Cognition and memory normal. Cognition is not impaired. Memory is not impaired. He does not exhibit impaired recent memory.         Judgment: Judgment normal.             Significant Labs: All pertinent labs within the past 24 hours have been reviewed.    Significant Imaging: I have reviewed all pertinent imaging results/findings within the past 24 hours.      Assessment/Plan:      * Cellulitis  Temp Readings from Last 3 Encounters:   09/15/23 98.5 °F (36.9 °C)   09/04/23 98.2 °F (36.8 °C) (Oral)     Lab Results   Component Value Date    WBC 8.01 09/15/2023     No results for input(s): "LACTATE" in the " last 72 hours.    Persistent skin infection, with edema, erythema, despite keflex use  Xray of right foot/ankle negative   Arterial sonogram LE: No hemodynamically significant stenosis demonstrated in the right or left lower extremity arterial system.   MRI: Dorsal midfoot skin ulceration with adjacent small subcutaneous focus of fluid concerning for possible abscess.  Diffuse dorsal soft tissue edema suggestive for infection/cellulitis  ID consulted and following: notes reviewed  Podiatry consulted and following: notes reviewed.   Will discuss with podiatry about need for surgery.   Antibiotics given-   Antibiotics (From admission, onward)    Start     Stop Route Frequency Ordered    09/14/23 0900  vancomycin (VANCOCIN) 1,750 mg in dextrose 5 % (D5W) 500 mL IVPB         -- IV Every 12 hours (non-standard times) 09/14/23 0857    09/13/23 0400  piperacillin-tazobactam (ZOSYN) 4.5 g in dextrose 5 % in water (D5W) 100 mL IVPB (MB+)         -- IV Every 8 hours (non-standard times) 09/13/23 0104    09/13/23 0156  vancomycin - pharmacy to dose  (vancomycin IVPB (PEDS and ADULTS))        See Hyperspace for full Linked Orders Report.    -- IV pharmacy to manage frequency 09/13/23 0100        Cultures were taken-   Microbiology Results (last 7 days)     ** No results found for the last 168 hours. **          Elevated alkaline phosphatase level  C/w monitoring      Hyponatremia  Patient has hyponatremia which is controlled,We will aim to correct the sodium by 4-6mEq in 24 hours. We will monitor sodium Daily. The hyponatremia is due to Dehydration/hypovolemia. We will obtain the following studies: none. We will treat the hyponatremia with IV fluids as follows: s/p fluids in ED. Encourage oral intake. The patient's sodium results have been reviewed and are listed below.  Recent Labs   Lab 09/15/23  0429   *       Normocytic anemia  Lab Results   Component Value Date    HGB 12.2 (L) 09/15/2023    HCT 33.8 (L) 09/15/2023      Cont to monitor  Transfuse if patient becomes hemodynamically unstable, symptomatic or H/H drops below 7/21.     Type 2 diabetes mellitus with circulatory disorder, without long-term current use of insulin  Lab Results   Component Value Date    HGBA1C 12.9 (H) 09/13/2023     Recent Labs   Lab 09/15/23  0522   POCTGLUCOSE 276*        Hold home oral agents- insulin is the preferred treatment for hyperglycemia  Cont current basal/prandial insulin regimen   Low dose correction scale   Cont blood glucose monitoring   BG goal:  Preprandial blood glucose target <140 mg/dL  Random glucoses <180 mg/dL  Avoid hypoglycemia -  Reduce antihyperglycemic therapy when caloric intake is reduced. Avoid insulin stacking as a result of repeated injection of prandial insulin at close intervals.   Avoid severe hyperglycemia  ADA diet  Antihyperglycemics (From admission, onward)    Start     Stop Route Frequency Ordered    09/14/23 0715  insulin aspart U-100 pen 6 Units         -- SubQ 3 times daily with meals 09/13/23 1746    09/13/23 2100  insulin detemir U-100 (Levemir) pen 14 Units         -- SubQ Nightly 09/13/23 1746    09/13/23 0159  insulin aspart U-100 pen 0-5 Units         -- SubQ Before meals & nightly PRN 09/13/23 0100             VTE Risk Mitigation (From admission, onward)         Ordered     IP VTE LOW RISK PATIENT  Once         09/13/23 1530     Place sequential compression device  Until discontinued         09/13/23 0055                      I have completed this tele-visit without the assistance of a telepresenter.    The attending portion of this evaluation, treatment, and documentation was performed per Amira Cooper MD via Telemedicine AudioVisual using the secure [employer] software platform with 2 way audio/video. The provider was located off-site and the patient is located in the hospital. The aforementioned video software was utilized to document the relevant history and physical exam    Amira Cooper  MD  Department of [facility] Medicine   [facility] Surg

## 2023-09-16 LAB
POCT GLUCOSE: 188 MG/DL (ref 70–110)
POCT GLUCOSE: 192 MG/DL (ref 70–110)
POCT GLUCOSE: 197 MG/DL (ref 70–110)
POCT GLUCOSE: 239 MG/DL (ref 70–110)

## 2023-09-16 PROCEDURE — 36415 COLL VENOUS BLD VENIPUNCTURE: CPT | Performed by: HOSPITALIST

## 2023-09-16 PROCEDURE — 25000003 PHARM REV CODE 250: Performed by: HOSPITALIST

## 2023-09-16 PROCEDURE — 63600175 PHARM REV CODE 636 W HCPCS: Performed by: HOSPITALIST

## 2023-09-16 PROCEDURE — 94761 N-INVAS EAR/PLS OXIMETRY MLT: CPT

## 2023-09-16 PROCEDURE — 99900035 HC TECH TIME PER 15 MIN (STAT)

## 2023-09-16 PROCEDURE — 11000001 HC ACUTE MED/SURG PRIVATE ROOM

## 2023-09-16 RX ORDER — AMOXICILLIN AND CLAVULANATE POTASSIUM 875; 125 MG/1; MG/1
1 TABLET, FILM COATED ORAL EVERY 12 HOURS
Status: DISCONTINUED | OUTPATIENT
Start: 2023-09-16 | End: 2023-09-17 | Stop reason: HOSPADM

## 2023-09-16 RX ORDER — DOXYCYCLINE HYCLATE 100 MG
100 TABLET ORAL EVERY 12 HOURS
Status: DISCONTINUED | OUTPATIENT
Start: 2023-09-16 | End: 2023-09-17 | Stop reason: HOSPADM

## 2023-09-16 RX ADMIN — ATORVASTATIN CALCIUM 40 MG: 40 TABLET, FILM COATED ORAL at 09:09

## 2023-09-16 RX ADMIN — INSULIN ASPART 2 UNITS: 100 INJECTION, SOLUTION INTRAVENOUS; SUBCUTANEOUS at 07:09

## 2023-09-16 RX ADMIN — AMOXICILLIN AND CLAVULANATE POTASSIUM 1 TABLET: 875; 125 TABLET, FILM COATED ORAL at 11:09

## 2023-09-16 RX ADMIN — DOXYCYCLINE HYCLATE 100 MG: 100 TABLET, COATED ORAL at 09:09

## 2023-09-16 RX ADMIN — DOXYCYCLINE HYCLATE 100 MG: 100 TABLET, COATED ORAL at 11:09

## 2023-09-16 RX ADMIN — INSULIN ASPART 9 UNITS: 100 INJECTION, SOLUTION INTRAVENOUS; SUBCUTANEOUS at 04:09

## 2023-09-16 RX ADMIN — INSULIN ASPART 9 UNITS: 100 INJECTION, SOLUTION INTRAVENOUS; SUBCUTANEOUS at 12:09

## 2023-09-16 RX ADMIN — INSULIN ASPART 9 UNITS: 100 INJECTION, SOLUTION INTRAVENOUS; SUBCUTANEOUS at 06:09

## 2023-09-16 RX ADMIN — PIPERACILLIN AND TAZOBACTAM 4.5 G: 4; .5 INJECTION, POWDER, LYOPHILIZED, FOR SOLUTION INTRAVENOUS; PARENTERAL at 01:09

## 2023-09-16 RX ADMIN — LISINOPRIL 10 MG: 10 TABLET ORAL at 09:09

## 2023-09-16 RX ADMIN — AMOXICILLIN AND CLAVULANATE POTASSIUM 1 TABLET: 875; 125 TABLET, FILM COATED ORAL at 09:09

## 2023-09-16 RX ADMIN — VANCOMYCIN HYDROCHLORIDE 1250 MG: 1.25 INJECTION, POWDER, LYOPHILIZED, FOR SOLUTION INTRAVENOUS at 06:09

## 2023-09-16 NOTE — PROGRESS NOTES
[facility] ID note    Patient afebrile.  On doxycycline and amoxicillin clavulanic acid    Aerobic wound culture right foot no growth so far    Can continue on p.o. antibiotics for 7 more days and discharged with Podiatry follow up    Please call if any questions   Keyshawn White  [facility] ID  [phone]

## 2023-09-16 NOTE — SUBJECTIVE & OBJECTIVE
Interval History: seen and examined at bedside. Stated that erythema and swelling improved. MRI of foot concerning for abscess. Podiatry and ID following. Underwent debridement yesterday. Denies any pain     Review of Systems   Constitutional:  Negative for chills, fatigue and fever.   HENT: Negative.     Eyes: Negative.    Respiratory:  Negative for cough and shortness of breath.    Cardiovascular:  Negative for chest pain, palpitations and leg swelling.   Gastrointestinal:  Negative for abdominal pain and vomiting.   Genitourinary: Negative.    Skin: Negative.    Neurological:  Negative for seizures and speech difficulty.   Psychiatric/Behavioral:  Negative for agitation and confusion. The patient is not nervous/anxious.      Objective:     Vital Signs (Most Recent):  Temp: 98.2 °F (36.8 °C) (09/16/23 0700)  Pulse: 91 (09/16/23 0700)  Resp: 18 (09/16/23 0700)  BP: (!) 160/105 (09/16/23 0700)  SpO2: 98 % (09/16/23 0815) Vital Signs (24h Range):  Temp:  [97.9 °F (36.6 °C)-98.2 °F (36.8 °C)] 98.2 °F (36.8 °C)  Pulse:  [] 91  Resp:  [18-20] 18  SpO2:  [96 %-99 %] 98 %  BP: (127-160)/() 160/105     Weight: 80.4 kg (177 lb 4 oz)  Body mass index is 29.5 kg/m².    Intake/Output Summary (Last 24 hours) at 9/16/2023 0858  Last data filed at 9/16/2023 0000  Gross per 24 hour   Intake 987.34 ml   Output --   Net 987.34 ml           Physical Exam  Vitals and nursing note reviewed.   Constitutional:       General: He is awake. He is not in acute distress.     Appearance: Normal appearance. He is well-developed and well-groomed. He is not ill-appearing, toxic-appearing or diaphoretic.   HENT:      Head: Normocephalic and atraumatic.   Cardiovascular:      Rate and Rhythm: Normal rate.   Pulmonary:      Effort: Pulmonary effort is normal. No tachypnea, accessory muscle usage or respiratory distress.   Neurological:      Mental Status: He is alert and oriented to person, place, and time. Mental status is at baseline.    Psychiatric:         Attention and Perception: Attention normal. He is attentive.         Mood and Affect: Mood normal. Mood is not anxious.         Speech: Speech normal.         Behavior: Behavior normal. Behavior is cooperative.         Thought Content: Thought content normal.         Cognition and Memory: Cognition and memory normal. Cognition is not impaired. Memory is not impaired. He does not exhibit impaired recent memory.         Judgment: Judgment normal.             Significant Labs: All pertinent labs within the past 24 hours have been reviewed.    Significant Imaging: I have reviewed all pertinent imaging results/findings within the past 24 hours.

## 2023-09-16 NOTE — PLAN OF CARE
Problem: Adult Inpatient Plan of Care  Goal: Plan of Care Review  Outcome: Ongoing, Progressing  Goal: Patient-Specific Goal (Individualized)  Outcome: Ongoing, Progressing  Goal: Absence of Hospital-Acquired Illness or Injury  Outcome: Ongoing, Progressing  Goal: Optimal Comfort and Wellbeing  Outcome: Ongoing, Progressing  Goal: Readiness for Transition of Care  Outcome: Ongoing, Progressing     Problem: Pain Chronic (Persistent) (Comorbidity Management)  Goal: Acceptable Pain Control and Functional Ability  Outcome: Ongoing, Progressing     Problem: Pain Acute  Goal: Acceptable Pain Control and Functional Ability  Outcome: Ongoing, Progressing     Problem: Fall Injury Risk  Goal: Absence of Fall and Fall-Related Injury  Outcome: Ongoing, Progressing     Problem: Infection  Goal: Absence of Infection Signs and Symptoms  Outcome: Ongoing, Progressing     Problem: Diabetes Comorbidity  Goal: Blood Glucose Level Within Targeted Range  Outcome: Ongoing, Progressing     Problem: Skin or Soft Tissue Infection  Goal: Absence of Infection Signs and Symptoms  Outcome: Ongoing, Progressing     Problem: Impaired Wound Healing  Goal: Optimal Wound Healing  Outcome: Ongoing, Progressing

## 2023-09-16 NOTE — ASSESSMENT & PLAN NOTE
"Temp Readings from Last 3 Encounters:   09/16/23 98.2 °F (36.8 °C) (Oral)   09/04/23 98.2 °F (36.8 °C) (Oral)     Lab Results   Component Value Date    WBC 8.01 09/15/2023     No results for input(s): "LACTATE" in the last 72 hours.    Persistent skin infection, with edema, erythema, despite keflex use  Xray of right foot/ankle negative   Arterial sonogram LE: No hemodynamically significant stenosis demonstrated in the right or left lower extremity arterial system.   MRI: Dorsal midfoot skin ulceration with adjacent small subcutaneous focus of fluid concerning for possible abscess.  Diffuse dorsal soft tissue edema suggestive for infection/cellulitis  ID consulted and following: notes reviewed  Podiatry consulted and following: notes reviewed. S/p excisional debridement performed with cultures taken  Cultures pending  Antibiotics given-   Antibiotics (From admission, onward)    Start     Stop Route Frequency Ordered    09/15/23 1000  vancomycin 1,250 mg in dextrose 5 % (D5W) 250 mL IVPB (Vial-Mate)         -- IV Every 8 hours (non-standard times) 09/15/23 0917    09/13/23 0400  piperacillin-tazobactam (ZOSYN) 4.5 g in dextrose 5 % in water (D5W) 100 mL IVPB (MB+)         -- IV Every 8 hours (non-standard times) 09/13/23 0104    09/13/23 0156  vancomycin - pharmacy to dose  (vancomycin IVPB (PEDS and ADULTS))        See Hyperspace for full Linked Orders Report.    -- IV pharmacy to manage frequency 09/13/23 0100        Cultures were taken-   Microbiology Results (last 7 days)     Procedure Component Value Units Date/Time    Aerobic culture [[id number]] Collected: 09/15/23 1328    Order Status: Completed Specimen: Wound from Foot, Right Updated: 09/16/23 0704     Aerobic Bacterial Culture No growth    Culture, Anaerobe [[id number]] Collected: 09/15/23 1328    Order Status: Sent Specimen: Wound from Foot, Right Updated: 09/15/23 1913        "

## 2023-09-16 NOTE — PROGRESS NOTES
[facility] Medicine  Telemedicine Progress Note    Patient Name: Juanpablo Gomez  MRN: [id number]  Patient Class: IP- Inpatient   Admission Date: 9/12/2023  Length of Stay: 4 days  Attending Physician: Amira Sutton MD  Primary Care Provider: Radha, Primary Doctor          Subjective:     Principal Problem:Cellulitis        HPI:  Juanpablo Gonzalez is a 38 yr old male with PMH of diabetes, who presents with foot wound.    Pt states that over the past week, his foot has had worsening erythema, swelling, and pain. He was prescribed oral antibiotics, although the pain and swelling persisted, which prompted him to come to the ED for further evaluation.    In the ED, triage vitals were significant for elevated blood pressures, tachycardia. CBC was significant for normoctyic anemia. CMP was significant for hyponatremia and elevated sugars.    XR of ankle and foot was negative for gangrene or fracture.     Medicine was consult for cellulitis, that failed outpatient txt.       Overview/Hospital Course:  No notes on file    Interval History: seen and examined at bedside. Stated that erythema and swelling improved. MRI of foot concerning for abscess. Podiatry and ID following. Underwent debridement yesterday. Denies any pain     Review of Systems   Constitutional:  Negative for chills, fatigue and fever.   HENT: Negative.     Eyes: Negative.    Respiratory:  Negative for cough and shortness of breath.    Cardiovascular:  Negative for chest pain, palpitations and leg swelling.   Gastrointestinal:  Negative for abdominal pain and vomiting.   Genitourinary: Negative.    Skin: Negative.    Neurological:  Negative for seizures and speech difficulty.   Psychiatric/Behavioral:  Negative for agitation and confusion. The patient is not nervous/anxious.      Objective:     Vital Signs (Most Recent):  Temp: 98.2 °F (36.8 °C) (09/16/23 0700)  Pulse: 91 (09/16/23 0700)  Resp: 18 (09/16/23 0700)  BP: (!) 160/105 (09/16/23  0700)  SpO2: 98 % (09/16/23 0815) Vital Signs (24h Range):  Temp:  [97.9 °F (36.6 °C)-98.2 °F (36.8 °C)] 98.2 °F (36.8 °C)  Pulse:  [] 91  Resp:  [18-20] 18  SpO2:  [96 %-99 %] 98 %  BP: (127-160)/() 160/105     Weight: 80.4 kg (177 lb 4 oz)  Body mass index is 29.5 kg/m².    Intake/Output Summary (Last 24 hours) at 9/16/2023 0858  Last data filed at 9/16/2023 0000  Gross per 24 hour   Intake 987.34 ml   Output --   Net 987.34 ml           Physical Exam  Vitals and nursing note reviewed.   Constitutional:       General: He is awake. He is not in acute distress.     Appearance: Normal appearance. He is well-developed and well-groomed. He is not ill-appearing, toxic-appearing or diaphoretic.   HENT:      Head: Normocephalic and atraumatic.   Cardiovascular:      Rate and Rhythm: Normal rate.   Pulmonary:      Effort: Pulmonary effort is normal. No tachypnea, accessory muscle usage or respiratory distress.   Neurological:      Mental Status: He is alert and oriented to person, place, and time. Mental status is at baseline.   Psychiatric:         Attention and Perception: Attention normal. He is attentive.         Mood and Affect: Mood normal. Mood is not anxious.         Speech: Speech normal.         Behavior: Behavior normal. Behavior is cooperative.         Thought Content: Thought content normal.         Cognition and Memory: Cognition and memory normal. Cognition is not impaired. Memory is not impaired. He does not exhibit impaired recent memory.         Judgment: Judgment normal.             Significant Labs: All pertinent labs within the past 24 hours have been reviewed.    Significant Imaging: I have reviewed all pertinent imaging results/findings within the past 24 hours.      Assessment/Plan:      * Cellulitis  Temp Readings from Last 3 Encounters:   09/16/23 98.2 °F (36.8 °C) (Oral)   09/04/23 98.2 °F (36.8 °C) (Oral)     Lab Results   Component Value Date    WBC 8.01 09/15/2023     No results  "for input(s): "LACTATE" in the last 72 hours.    Persistent skin infection, with edema, erythema, despite keflex use  Xray of right foot/ankle negative   Arterial sonogram LE: No hemodynamically significant stenosis demonstrated in the right or left lower extremity arterial system.   MRI: Dorsal midfoot skin ulceration with adjacent small subcutaneous focus of fluid concerning for possible abscess.  Diffuse dorsal soft tissue edema suggestive for infection/cellulitis  ID consulted and following: notes reviewed  Podiatry consulted and following: notes reviewed. S/p excisional debridement performed with cultures taken  Cultures pending  Antibiotics given-   Antibiotics (From admission, onward)    Start     Stop Route Frequency Ordered    09/15/23 1000  vancomycin 1,250 mg in dextrose 5 % (D5W) 250 mL IVPB (Vial-Mate)         -- IV Every 8 hours (non-standard times) 09/15/23 0917    09/13/23 0400  piperacillin-tazobactam (ZOSYN) 4.5 g in dextrose 5 % in water (D5W) 100 mL IVPB (MB+)         -- IV Every 8 hours (non-standard times) 09/13/23 0104    09/13/23 0156  vancomycin - pharmacy to dose  (vancomycin IVPB (PEDS and ADULTS))        See Hyperspace for full Linked Orders Report.    -- IV pharmacy to manage frequency 09/13/23 0100        Cultures were taken-   Microbiology Results (last 7 days)     Procedure Component Value Units Date/Time    Aerobic culture [[id number]] Collected: 09/15/23 1328    Order Status: Completed Specimen: Wound from Foot, Right Updated: 09/16/23 0704     Aerobic Bacterial Culture No growth    Culture, Anaerobe [[id number]] Collected: 09/15/23 1328    Order Status: Sent Specimen: Wound from Foot, Right Updated: 09/15/23 1913          Elevated alkaline phosphatase level  C/w monitoring      Hyponatremia  Patient has hyponatremia which is controlled,We will aim to correct the sodium by 4-6mEq in 24 hours. We will monitor sodium Daily. The hyponatremia is due to Dehydration/hypovolemia. We will " "obtain the following studies: none. We will treat the hyponatremia with IV fluids as follows: s/p fluids in ED. Encourage oral intake. The patient's sodium results have been reviewed and are listed below.  No results for input(s): "NA" in the last 24 hours.    Normocytic anemia  Lab Results   Component Value Date    HGB 12.2 (L) 09/15/2023    HCT 33.8 (L) 09/15/2023     Cont to monitor  Transfuse if patient becomes hemodynamically unstable, symptomatic or H/H drops below 7/21.     Type 2 diabetes mellitus with circulatory disorder, without long-term current use of insulin  Lab Results   Component Value Date    HGBA1C 12.9 (H) 09/13/2023     Recent Labs   Lab 09/16/23  0633   POCTGLUCOSE 239*        Hold home oral agents- insulin is the preferred treatment for hyperglycemia  Cont current basal/prandial insulin regimen   Low dose correction scale   Cont blood glucose monitoring   BG goal:  Preprandial blood glucose target <140 mg/dL  Random glucoses <180 mg/dL  Avoid hypoglycemia -  Reduce antihyperglycemic therapy when caloric intake is reduced. Avoid insulin stacking as a result of repeated injection of prandial insulin at close intervals.   Avoid severe hyperglycemia  ADA diet  Antihyperglycemics (From admission, onward)    Start     Stop Route Frequency Ordered    09/15/23 1130  insulin aspart U-100 pen 9 Units         -- SubQ 3 times daily with meals 09/15/23 0814    09/15/23 0900  insulin detemir U-100 (Levemir) pen 10 Units         -- SubQ 2 times daily 09/15/23 0814    09/13/23 0159  insulin aspart U-100 pen 0-5 Units         -- SubQ Before meals & nightly PRN 09/13/23 0100             VTE Risk Mitigation (From admission, onward)         Ordered     IP VTE LOW RISK PATIENT  Once         09/13/23 1530     Place sequential compression device  Until discontinued         09/13/23 0055                      I have completed this tele-visit without the assistance of a telepresenter.    The attending portion of this " evaluation, treatment, and documentation was performed per Amira Cooper MD via Telemedicine AudioVisual using the secure [employer] software platform with 2 way audio/video. The provider was located off-site and the patient is located in the hospital. The aforementioned video software was utilized to document the relevant history and physical exam    Amira Cooper MD  Department of [facility] Medicine   [facility]

## 2023-09-16 NOTE — PROGRESS NOTES
[facility]                   Pharmacy  Pharmacy Vancomycin/AG Sign-off    Therapy with vancomycin completed and/or consult discontinued by provider.  Pharmacy will sign off, please re-consult as needed. Thank you for allowing us to participate in this patient's care.     Claudia Zimmerman, PharmD    [phone]

## 2023-09-16 NOTE — ASSESSMENT & PLAN NOTE
Lab Results   Component Value Date    HGBA1C 12.9 (H) 09/13/2023     Recent Labs   Lab 09/16/23  0633   POCTGLUCOSE 239*        Hold home oral agents- insulin is the preferred treatment for hyperglycemia  Cont current basal/prandial insulin regimen   Low dose correction scale   Cont blood glucose monitoring   BG goal:  Preprandial blood glucose target <140 mg/dL  Random glucoses <180 mg/dL  Avoid hypoglycemia -  Reduce antihyperglycemic therapy when caloric intake is reduced. Avoid insulin stacking as a result of repeated injection of prandial insulin at close intervals.   Avoid severe hyperglycemia  ADA diet  Antihyperglycemics (From admission, onward)    Start     Stop Route Frequency Ordered    09/15/23 1130  insulin aspart U-100 pen 9 Units         -- SubQ 3 times daily with meals 09/15/23 0814    09/15/23 0900  insulin detemir U-100 (Levemir) pen 10 Units         -- SubQ 2 times daily 09/15/23 0814    09/13/23 0159  insulin aspart U-100 pen 0-5 Units         -- SubQ Before meals & nightly PRN 09/13/23 0100

## 2023-09-16 NOTE — ASSESSMENT & PLAN NOTE
"Patient has hyponatremia which is controlled,We will aim to correct the sodium by 4-6mEq in 24 hours. We will monitor sodium Daily. The hyponatremia is due to Dehydration/hypovolemia. We will obtain the following studies: none. We will treat the hyponatremia with IV fluids as follows: s/p fluids in ED. Encourage oral intake. The patient's sodium results have been reviewed and are listed below.  No results for input(s): "NA" in the last 24 hours.  "

## 2023-09-17 VITALS
HEART RATE: 98 BPM | DIASTOLIC BLOOD PRESSURE: 96 MMHG | TEMPERATURE: 98 F | SYSTOLIC BLOOD PRESSURE: 151 MMHG | OXYGEN SATURATION: 98 % | WEIGHT: 176.13 LBS | HEIGHT: 65 IN | RESPIRATION RATE: 18 BRPM | BODY MASS INDEX: 29.34 KG/M2

## 2023-09-17 LAB — POCT GLUCOSE: 283 MG/DL (ref 70–110)

## 2023-09-17 PROCEDURE — 94761 N-INVAS EAR/PLS OXIMETRY MLT: CPT

## 2023-09-17 PROCEDURE — 25000003 PHARM REV CODE 250: Performed by: HOSPITALIST

## 2023-09-17 PROCEDURE — 99900035 HC TECH TIME PER 15 MIN (STAT)

## 2023-09-17 RX ORDER — BLOOD-GLUCOSE CONTROL, NORMAL
1 EACH MISCELLANEOUS 4 TIMES DAILY
Qty: 200 EACH | Refills: 3 | Status: SHIPPED | OUTPATIENT
Start: 2023-09-17 | End: 2024-09-16

## 2023-09-17 RX ORDER — INSULIN PUMP SYRINGE, 3 ML
EACH MISCELLANEOUS
Qty: 1 EACH | Refills: 0 | Status: SHIPPED | OUTPATIENT
Start: 2023-09-17 | End: 2024-09-16

## 2023-09-17 RX ORDER — AMOXICILLIN AND CLAVULANATE POTASSIUM 875; 125 MG/1; MG/1
1 TABLET, FILM COATED ORAL EVERY 12 HOURS
Qty: 14 TABLET | Refills: 0 | Status: SHIPPED | OUTPATIENT
Start: 2023-09-17 | End: 2023-09-24

## 2023-09-17 RX ORDER — INSULIN ASPART 100 [IU]/ML
9 INJECTION, SOLUTION INTRAVENOUS; SUBCUTANEOUS 3 TIMES DAILY
Qty: 24.3 ML | Refills: 3 | Status: SHIPPED | OUTPATIENT
Start: 2023-09-17 | End: 2024-09-16

## 2023-09-17 RX ORDER — LISINOPRIL 10 MG/1
10 TABLET ORAL DAILY
Qty: 90 TABLET | Refills: 3 | Status: SHIPPED | OUTPATIENT
Start: 2023-09-18 | End: 2024-09-17

## 2023-09-17 RX ORDER — DOXYCYCLINE HYCLATE 100 MG
100 TABLET ORAL EVERY 12 HOURS
Qty: 14 TABLET | Refills: 0 | Status: SHIPPED | OUTPATIENT
Start: 2023-09-17 | End: 2023-09-24

## 2023-09-17 RX ORDER — INSULIN ASPART 100 [IU]/ML
0-5 INJECTION, SOLUTION INTRAVENOUS; SUBCUTANEOUS
Qty: 3 ML | Refills: 0 | Status: SHIPPED | OUTPATIENT
Start: 2023-09-17 | End: 2024-09-16

## 2023-09-17 RX ORDER — ATORVASTATIN CALCIUM 40 MG/1
40 TABLET, FILM COATED ORAL DAILY
Qty: 90 TABLET | Refills: 3 | Status: SHIPPED | OUTPATIENT
Start: 2023-09-18 | End: 2024-09-17

## 2023-09-17 RX ADMIN — DOXYCYCLINE HYCLATE 100 MG: 100 TABLET, COATED ORAL at 08:09

## 2023-09-17 RX ADMIN — ATORVASTATIN CALCIUM 40 MG: 40 TABLET, FILM COATED ORAL at 08:09

## 2023-09-17 RX ADMIN — AMOXICILLIN AND CLAVULANATE POTASSIUM 1 TABLET: 875; 125 TABLET, FILM COATED ORAL at 08:09

## 2023-09-17 RX ADMIN — LISINOPRIL 10 MG: 10 TABLET ORAL at 08:09

## 2023-09-17 RX ADMIN — INSULIN ASPART 3 UNITS: 100 INJECTION, SOLUTION INTRAVENOUS; SUBCUTANEOUS at 06:09

## 2023-09-17 RX ADMIN — INSULIN ASPART 9 UNITS: 100 INJECTION, SOLUTION INTRAVENOUS; SUBCUTANEOUS at 08:09

## 2023-09-17 NOTE — CARE UPDATE
Juanpablo Damico/MRN:[id number]    [facility] - I had them create a chart.  There are no referrals currently attached to chart.  But it can take up to 72 hours to be uploaded. I can call again on Monday.      [facility] has Diabetic ED in [facility] Pod[facility]ry/I will call on Monday as they are closed for the day.

## 2023-09-17 NOTE — NURSING
Discharge orders noted. Additional clinical references attached. Patient's discharge instructions given by bedside RN and reviewed by this VN with patient. Education provided on home care instructions, new and previous medications, diagnosis, when to seek medical attention, and follow-up appointments. New medications delivered by pharmacy. Patient verbalized understanding. Patient's family to provide ride/transportation home. All questions answered.Floor nurse notified.   Noted discrepancy with sliding scale aspart orders, verified with Dr. Sutton, pharmacy and patient called. Request sent to Diabetic Educator, Crissy Dominguez RN for patient contact for further educational resources.

## 2023-09-17 NOTE — NURSING
Wound care performed per MD order. Applied hydrofera blue, moistened with saline, and mepilex to right foot.

## 2023-09-17 NOTE — PLAN OF CARE
SW met with patient via [employer] to discuss dc planning, pt declined the use of . Pt will dc with no needs noted. SW notified pt of appt information and referrals previously sent to [facility], pt expressed understanding. Pt stated he will call his brother to provide pt transportation home.     Pt cleared from CM standpoint. Bedside nurse and VN notified.       09/17/23 0906   Final Note   Assessment Type Final Discharge Note   Anticipated Discharge Disposition Home   What phone number can be called within the next 1-3 days to see how you are doing after discharge? [phone]   Post-Acute Status   Discharge Delays None known at this time

## 2023-09-17 NOTE — PLAN OF CARE
Problem: Adult Inpatient Plan of Care  Goal: Plan of Care Review  Outcome: Ongoing, Progressing  Goal: Patient-Specific Goal (Individualized)  Outcome: Ongoing, Progressing  Goal: Optimal Comfort and Wellbeing  Outcome: Ongoing, Progressing     Problem: Pain Chronic (Persistent) (Comorbidity Management)  Goal: Acceptable Pain Control and Functional Ability  Outcome: Ongoing, Progressing     Problem: Pain Acute  Goal: Acceptable Pain Control and Functional Ability  Outcome: Ongoing, Progressing     Problem: Fall Injury Risk  Goal: Absence of Fall and Fall-Related Injury  Outcome: Ongoing, Progressing     Problem: Diabetes Comorbidity  Goal: Blood Glucose Level Within Targeted Range  Outcome: Ongoing, Progressing     Problem: Skin or Soft Tissue Infection  Goal: Absence of Infection Signs and Symptoms  Outcome: Ongoing, Progressing

## 2023-09-17 NOTE — ASSESSMENT & PLAN NOTE
"Temp Readings from Last 3 Encounters:   09/17/23 98.6 °F (37 °C) (Oral)   09/04/23 98.2 °F (36.8 °C) (Oral)     Lab Results   Component Value Date    WBC 8.01 09/15/2023     No results for input(s): "LACTATE" in the last 72 hours.    Persistent skin infection, with edema, erythema, despite keflex use  Xray of right foot/ankle negative   Arterial sonogram LE: No hemodynamically significant stenosis demonstrated in the right or left lower extremity arterial system.   MRI: Dorsal midfoot skin ulceration with adjacent small subcutaneous focus of fluid concerning for possible abscess.  Diffuse dorsal soft tissue edema suggestive for infection/cellulitis  ID consulted and following: notes reviewed  Podiatry consulted and following: notes reviewed. S/p excisional debridement performed with cultures taken  Cultures NGTD  Complete 7 days of oral abx   Antibiotics given-   Antibiotics (From admission, onward)    Start     Stop Route Frequency Ordered    09/16/23 1000  doxycycline tablet 100 mg         -- Oral Every 12 hours 09/16/23 0849    09/16/23 1000  amoxicillin-clavulanate 875-125mg per tablet 1 tablet         -- Oral Every 12 hours 09/16/23 0849        Cultures were taken-   Microbiology Results (last 7 days)     Procedure Component Value Units Date/Time    Aerobic culture [[id number]] Collected: 09/15/23 1328    Order Status: Completed Specimen: Wound from Foot, Right Updated: 09/16/23 0704     Aerobic Bacterial Culture No growth    Culture, Anaerobe [[id number]] Collected: 09/15/23 1328    Order Status: Sent Specimen: Wound from Foot, Right Updated: 09/15/23 1913        "

## 2023-09-17 NOTE — DISCHARGE SUMMARY
[facility] Medicine  Discharge Summary      Patient Name: Juanpablo Gomez  MRN: [id number]  Patient Class: IP- Inpatient  Admission Date: 9/12/2023  Hospital Length of Stay: 5 days  Discharge Date and Time:  09/17/2023 8:50 AM  Attending Physician: Amira Sutton MD   Discharging Provider: Amira Cooper MD  Primary Care Provider: Radha, Primary Doctor      HPI:   Juanpablo Gonzalez is a 38 yr old male with PMH of diabetes, who presents with foot wound.    Pt states that over the past week, his foot has had worsening erythema, swelling, and pain. He was prescribed oral antibiotics, although the pain and swelling persisted, which prompted him to come to the ED for further evaluation.    In the ED, triage vitals were significant for elevated blood pressures, tachycardia. CBC was significant for normoctyic anemia. CMP was significant for hyponatremia and elevated sugars.    XR of ankle and foot was negative for gangrene or fracture.     Medicine was consult for cellulitis, that failed outpatient txt.       * No surgery found *      Hospital Course:   No notes on file     Goals of Care Treatment Preferences:  Code Status: Full Code      Consults:   Consults (From admission, onward)        Status Ordering Provider     Inpatient consult to Diabetes educator  Once        Provider:  (Not yet assigned)    Completed MARISOL GUAJARDO     Inpatient virtual consult to Hospital Medicine  Once        Provider:  (Not yet assigned)    Completed MARISOL GUAJARDO     Inpatient consult to Podiatry  Once        Provider:  (Not yet assigned)    Completed ALIZE GARCIA     Inpatient consult to Infectious Diseases  Once        Provider:  (Not yet assigned)    Completed MARISOL GUAJARDO     Inpatient consult to Podiatry  Once        Provider:  (Not yet assigned)    Completed MARISOL GUAJARDO     Inpatient consult to Registered Dietitian/Nutritionist  Once        Provider:  (Not yet assigned)     "Completed ALIZE GARCIA          Renal/  Hyponatremia  Patient has hyponatremia which is controlled,We will aim to correct the sodium by 4-6mEq in 24 hours. We will monitor sodium Daily. The hyponatremia is due to Dehydration/hypovolemia. We will obtain the following studies: none. We will treat the hyponatremia with IV fluids as follows: s/p fluids in ED. Encourage oral intake. The patient's sodium results have been reviewed and are listed below.  No results for input(s): "NA" in the last 24 hours.    ID  * Cellulitis  Temp Readings from Last 3 Encounters:   09/17/23 98.6 °F (37 °C) (Oral)   09/04/23 98.2 °F (36.8 °C) (Oral)     Lab Results   Component Value Date    WBC 8.01 09/15/2023     No results for input(s): "LACTATE" in the last 72 hours.    Persistent skin infection, with edema, erythema, despite keflex use  Xray of right foot/ankle negative   Arterial sonogram LE: No hemodynamically significant stenosis demonstrated in the right or left lower extremity arterial system.   MRI: Dorsal midfoot skin ulceration with adjacent small subcutaneous focus of fluid concerning for possible abscess.  Diffuse dorsal soft tissue edema suggestive for infection/cellulitis  ID consulted and following: notes reviewed  Podiatry consulted and following: notes reviewed. S/p excisional debridement performed with cultures taken  Cultures NGTD  Complete 7 days of oral abx   Antibiotics given-   Antibiotics (From admission, onward)    Start     Stop Route Frequency Ordered    09/16/23 1000  doxycycline tablet 100 mg         -- Oral Every 12 hours 09/16/23 0849    09/16/23 1000  amoxicillin-clavulanate 875-125mg per tablet 1 tablet         -- Oral Every 12 hours 09/16/23 0849        Cultures were taken-   Microbiology Results (last 7 days)     Procedure Component Value Units Date/Time    Aerobic culture [[id number]] Collected: 09/15/23 1328    Order Status: Completed Specimen: Wound from Foot, Right Updated: 09/16/23 0704    "  Aerobic Bacterial Culture No growth    Culture, Anaerobe [[id number]] Collected: 09/15/23 1328    Order Status: Sent Specimen: Wound from Foot, Right Updated: 09/15/23 1913          Oncology  Normocytic anemia  Lab Results   Component Value Date    HGB 12.2 (L) 09/15/2023    HCT 33.8 (L) 09/15/2023     Cont to monitor  Transfuse if patient becomes hemodynamically unstable, symptomatic or H/H drops below 7/21.     Endocrine  Type 2 diabetes mellitus with circulatory disorder, without long-term current use of insulin  Lab Results   Component Value Date    HGBA1C 12.9 (H) 09/13/2023     Recent Labs   Lab 09/17/23  0608   POCTGLUCOSE 283*        Hold home oral agents- insulin is the preferred treatment for hyperglycemia  Cont current basal/prandial insulin regimen   Low dose correction scale   Cont blood glucose monitoring   BG goal:  Preprandial blood glucose target <140 mg/dL  Random glucoses <180 mg/dL  Avoid hypoglycemia -  Reduce antihyperglycemic therapy when caloric intake is reduced. Avoid insulin stacking as a result of repeated injection of prandial insulin at close intervals.   Avoid severe hyperglycemia  ADA diet  Antihyperglycemics (From admission, onward)    Start     Stop Route Frequency Ordered    09/18/23 0900  insulin detemir U-100 (Levemir) pen 20 Units         -- SubQ Daily 09/17/23 0841    09/15/23 1130  insulin aspart U-100 pen 9 Units         -- SubQ 3 times daily with meals 09/15/23 0814    09/13/23 0159  insulin aspart U-100 pen 0-5 Units         -- SubQ Before meals & nightly PRN 09/13/23 0100           Other  Elevated alkaline phosphatase level  C/w monitoring        Final Active Diagnoses:    Diagnosis Date Noted POA    PRINCIPAL PROBLEM:  Cellulitis [L03.90] 09/13/2023 Yes     Chronic    Type 2 diabetes mellitus with circulatory disorder, without long-term current use of insulin [E11.59] 09/13/2023 Yes    Normocytic anemia [D64.9] 09/13/2023 Yes    Hyponatremia [E87.1] 09/13/2023 Yes  "   Elevated alkaline phosphatase level [R74.8] 09/13/2023 Yes      Problems Resolved During this Admission:       Discharged Condition: good    Disposition: Home or Self Care    Follow Up:   Follow-up Information     [facility] Schedule an appointment as soon as possible for a visit.    Specialties: Endocrinology, Nephrology  Why: As needed, FOLLOW UP WITH ENDOCRINOLOGIST  Contact information:  [address] [address]  [phone]             [facility] - Wound. Schedule an appointment as soon as possible for a visit.    Specialty: Wound Care  Why: As needed, FOLLOW UP WITH PODIATRY AFTER DISCHARGE, For wound re-check  Contact information:  [address] [address]  [phone]             [facility] -Primary. Go in 1 week(s).    Specialty: Internal Medicine  Why: DISCHARGE FOLLOW UP WITH COMMUNITY PCP  Contact information:  [address] [address]  [phone]                       Patient Instructions:      Ambulatory referral/consult to Internal Medicine   Standing Status: Future   Referral Priority: Urgent Referral Type: Consultation   Referral Reason: Specialty Services Required   Requested Specialty: Internal Medicine   Number of Visits Requested: 1     Ambulatory referral/consult to Endocrinology   Standing Status: Future   Referral Priority: Urgent Referral Type: Consultation   Requested Specialty: Endocrinology   Number of Visits Requested: 1     Ambulatory referral/consult to Podiatry   Standing Status: Future   Referral Priority: Urgent Referral Type: Consultation   Referral Reason: Specialty Services Required   Requested Specialty: Podiatry   Number of Visits Requested: 1     Diet diabetic     Notify your health care provider if you experience any of the following:  temperature >100.4       Significant Diagnostic Studies: Labs: CMP No results for input(s): "NA", "K", "CL", "CO2", " ""GLU", "BUN", "CREATININE", "CALCIUM", "PROT", "ALBUMIN", "BILITOT", "ALKPHOS", "AST", "ALT", "ANIONGAP", "ESTGFRAFRICA", "EGFRNONAA" in the last 48 hours. and INR No results found for: "INR", "PROTIME"  Microbiology: Wound Culture: negative    Pending Diagnostic Studies:     None         Medications:  Reconciled Home Medications:      Medication List      START taking these medications    amoxicillin-clavulanate 875-125mg 875-125 mg per tablet  Commonly known as: AUGMENTIN  Take 1 tablet by mouth every 12 (twelve) hours. for 7 days     atorvastatin 40 MG tablet  Commonly known as: LIPITOR  Take 1 tablet (40 mg total) by mouth once daily.  Start taking on: September 18, 2023     blood-glucose meter kit  Use as instructed     doxycycline 100 MG tablet  Commonly known as: VIBRA-TABS  Take 1 tablet (100 mg total) by mouth every 12 (twelve) hours. for 7 days     * insulin aspart U-100 100 unit/mL (3 mL) Inpn pen  Commonly known as: NovoLOG  Inject 9 Units into the skin 3 (three) times daily.     * insulin aspart U-100 100 unit/mL (3 mL) Inpn pen  Commonly known as: NovoLOG  Inject 0-5 Units into the skin before meals and at bedtime as needed (Hyperglycemia).     insulin detemir U-100 (Levemir) 100 unit/mL (3 mL) Inpn pen  Inject 20 Units into the skin once daily.  Start taking on: September 18, 2023     lancets 30 gauge Misc  Commonly known as: 2-IN-1 LANCET DEVICE  1 lancet  by Misc.(Non-Drug; Combo Route) route 4 (four) times daily.     lisinopriL 10 MG tablet  Take 1 tablet (10 mg total) by mouth once daily.  Start taking on: September 18, 2023            Indwelling Lines/Drains at time of discharge:   Lines/Drains/Airways     None                 Time spent on the discharge of patient: 45 minutes         The attending portion of this evaluation, treatment, and documentation was performed per Amira Cooper MD via Telemedicine AudioVisual using the secure [employer] software platform with 2 way audio/video. The provider " was located off-site and the patient is located in the hospital. The aforementioned video software was utilized to document the relevant history and physical exam    Amira Cooper MD  Department of Hospital Medicine  [facility]

## 2023-09-17 NOTE — ASSESSMENT & PLAN NOTE
Lab Results   Component Value Date    HGBA1C 12.9 (H) 09/13/2023     Recent Labs   Lab 09/17/23  0608   POCTGLUCOSE 283*        Hold home oral agents- insulin is the preferred treatment for hyperglycemia  Cont current basal/prandial insulin regimen   Low dose correction scale   Cont blood glucose monitoring   BG goal:  Preprandial blood glucose target <140 mg/dL  Random glucoses <180 mg/dL  Avoid hypoglycemia -  Reduce antihyperglycemic therapy when caloric intake is reduced. Avoid insulin stacking as a result of repeated injection of prandial insulin at close intervals.   Avoid severe hyperglycemia  ADA diet  Antihyperglycemics (From admission, onward)    Start     Stop Route Frequency Ordered    09/18/23 0900  insulin detemir U-100 (Levemir) pen 20 Units         -- SubQ Daily 09/17/23 0841    09/15/23 1130  insulin aspart U-100 pen 9 Units         -- SubQ 3 times daily with meals 09/15/23 0814    09/13/23 0159  insulin aspart U-100 pen 0-5 Units         -- SubQ Before meals & nightly PRN 09/13/23 0100

## 2023-09-18 LAB — BACTERIA SPEC AEROBE CULT: NORMAL

## 2023-09-19 LAB — BACTERIA SPEC ANAEROBE CULT: NORMAL
